# Patient Record
Sex: FEMALE | Race: ASIAN | NOT HISPANIC OR LATINO | Employment: FULL TIME | URBAN - METROPOLITAN AREA
[De-identification: names, ages, dates, MRNs, and addresses within clinical notes are randomized per-mention and may not be internally consistent; named-entity substitution may affect disease eponyms.]

---

## 2019-01-18 ENCOUNTER — OFFICE VISIT (OUTPATIENT)
Dept: INTERNAL MEDICINE | Facility: CLINIC | Age: 29
End: 2019-01-18

## 2019-01-18 VITALS
BODY MASS INDEX: 23.22 KG/M2 | WEIGHT: 139.4 LBS | TEMPERATURE: 98.4 F | SYSTOLIC BLOOD PRESSURE: 110 MMHG | OXYGEN SATURATION: 99 % | HEART RATE: 78 BPM | HEIGHT: 65 IN | DIASTOLIC BLOOD PRESSURE: 76 MMHG

## 2019-01-18 DIAGNOSIS — L73.9 FOLLICULITIS: Primary | ICD-10-CM

## 2019-01-18 DIAGNOSIS — F41.9 ANXIETY: ICD-10-CM

## 2019-01-18 DIAGNOSIS — J02.9 ACUTE PHARYNGITIS, UNSPECIFIED ETIOLOGY: ICD-10-CM

## 2019-01-18 DIAGNOSIS — L30.9 DERMATITIS: ICD-10-CM

## 2019-01-18 PROCEDURE — 99202 OFFICE O/P NEW SF 15 MIN: CPT | Performed by: NURSE PRACTITIONER

## 2019-01-18 RX ORDER — CEPHALEXIN 500 MG/1
500 CAPSULE ORAL 3 TIMES DAILY
Qty: 30 CAPSULE | Refills: 0 | OUTPATIENT
Start: 2019-01-18 | End: 2020-09-20

## 2019-01-18 NOTE — PROGRESS NOTES
"Subjective   Rosemary Hu is a 28 y.o. female.   Chief Complaint   Patient presents with   • Establish Care   • Eczema   • Anxiety      History of Present Illness  patient has eczema to her legs and arms.  She says it has gotten progressively worse.  No specific trigger.  Onset several years ago.  She is been using betamethasone cream on it.  She also has anxiety.  She says she believes it is just due to life in general.  She recently changed jobs, got , and bought a house.  Denies thoughts of self-harm.  Not in counseling.  She does report she has a bit of a sore throat.  No fever or chills.  No ear pain, headache.  Has clear sinus drainage.  No cough, shortness of air, chest pain, abdominal pain, nausea vomiting or diarrhea    Social: SHe is , works as an , does not smoke, use recreational drugs, alcohol    The following portions of the patient's history were reviewed and updated as appropriate: allergies, current medications, past family history, past medical history, past social history, past surgical history and problem list.    Current Outpatient Medications:   •  cephalexin (KEFLEX) 500 MG capsule, Take 1 capsule by mouth 3 (Three) Times a Day., Disp: 30 capsule, Rfl: 0    Review of Systems Consitutional, HEENT, Respiratory, CV, GI, , Skin, Musculoskeletal, Neuro-mental, Endocrinological, Hematological were reviewed.  Positives were discussed in the HPI, otherwise ROS was negative   /76   Pulse 78   Temp 98.4 °F (36.9 °C)   Ht 165.1 cm (65\")   Wt 63.2 kg (139 lb 6.4 oz)   LMP 01/01/2019   SpO2 99%   Breastfeeding? No   BMI 23.20 kg/m²     Objective   No Known Allergies    Physical Exam   Constitutional: She is oriented to person, place, and time. She appears well-developed and well-nourished. No distress.   HENT:   Head: Normocephalic.   Right Ear: External ear normal.   Left Ear: External ear normal.   Mouth/Throat: Oropharynx is clear and moist.   TMs are dull.  " Throat pink without exudate nontender over sinuses.  No nodes   Eyes: Right eye exhibits no discharge. Left eye exhibits no discharge. No scleral icterus.   Neck: Neck supple. No thyromegaly present.   Cardiovascular: Normal rate, regular rhythm, normal heart sounds and intact distal pulses. Exam reveals no gallop and no friction rub.   No murmur heard.  Pulmonary/Chest: Effort normal and breath sounds normal. No stridor. No respiratory distress. She has no wheezes. She has no rales.   Abdominal: Soft. Bowel sounds are normal. She exhibits no mass. There is no tenderness.   Lymphadenopathy:     She has no cervical adenopathy.   Neurological: She is alert and oriented to person, place, and time.   Skin: Skin is warm and dry. Capillary refill takes less than 2 seconds.   Is pink.  Has scaly rashes and patches to arms and legs.  She has 1 patch on the left anterior medial thigh that is red and excoriated.   Psychiatric: She has a normal mood and affect. Her behavior is normal. Judgment and thought content normal.   Nursing note and vitals reviewed.      Procedures    LABS  No results found for this or any previous visit.    Assessment/Plan   Rosemary was seen today for establish care, eczema and anxiety.    Diagnoses and all orders for this visit:    Folliculitis  -     cephalexin (KEFLEX) 500 MG capsule; Take 1 capsule by mouth 3 (Three) Times a Day.    Dermatitis  -     Ambulatory Referral to Dermatology    Anxiety    Acute pharyngitis, unspecified etiology          Patient Instructions   Cephalexin for folliculitis.  We will referred to dermatology for dermatitis.  Tylenol and antihistamine of choice for pharyngitis.  Recommend relaxation techniques and biofeedback for anxiety.  Counseling at [facility].  Return to the clinic.  Pt verbalizes understanding and agreement with plan of care.     EMR Dragon/transcription disclaimer:  Please note that portions of this note were completed with a voice recognition  program.  Electronic transcription of the voice recognition program may permit erroneous words or phrases to be inadvertently transcribed.  Although I have reviewed the note for such errors, some may still exist in this documentation   Anita Be, APRN

## 2019-01-21 NOTE — PATIENT INSTRUCTIONS
Cephalexin for folliculitis.  We will referred to dermatology for dermatitis.  Tylenol and antihistamine of choice for pharyngitis.  Recommend relaxation techniques and biofeedback for anxiety.  Counseling at [facility].  Return to the clinic.  Pt verbalizes understanding and agreement with plan of care.

## 2020-02-18 ENCOUNTER — TELEPHONE (OUTPATIENT)
Dept: INTERNAL MEDICINE | Facility: CLINIC | Age: 30
End: 2020-02-18

## 2020-02-18 NOTE — TELEPHONE ENCOUNTER
lvm for pt to est care with different provider as new patient since their last provider skinny jean is no longer in this practice.

## 2020-09-20 ENCOUNTER — APPOINTMENT (OUTPATIENT)
Dept: GENERAL RADIOLOGY | Facility: HOSPITAL | Age: 30
End: 2020-09-20

## 2020-09-20 ENCOUNTER — HOSPITAL ENCOUNTER (EMERGENCY)
Facility: HOSPITAL | Age: 30
Discharge: HOME | End: 2020-09-20
Attending: EMERGENCY MEDICINE | Admitting: EMERGENCY MEDICINE

## 2020-09-20 VITALS
HEART RATE: 56 BPM | RESPIRATION RATE: 16 BRPM | OXYGEN SATURATION: 99 % | HEIGHT: 65 IN | BODY MASS INDEX: 23.32 KG/M2 | WEIGHT: 140 LBS | SYSTOLIC BLOOD PRESSURE: 122 MMHG | TEMPERATURE: 97.7 F | DIASTOLIC BLOOD PRESSURE: 78 MMHG

## 2020-09-20 DIAGNOSIS — S86.911A KNEE STRAIN, RIGHT, INITIAL ENCOUNTER: Primary | ICD-10-CM

## 2020-09-20 PROCEDURE — 99283 EMERGENCY DEPT VISIT LOW MDM: CPT

## 2020-09-20 PROCEDURE — 73560 X-RAY EXAM OF KNEE 1 OR 2: CPT

## 2020-09-20 RX ORDER — FLUOXETINE 10 MG/1
10 CAPSULE ORAL DAILY
COMMUNITY

## 2020-09-20 NOTE — ED PROVIDER NOTES
Subjective   30-year-old female presents to the emergency department with complaints of right knee pain.  The patient states that she was playing soccer yesterday and change directions abruptly.  When she did, she felt a burning pain in the medial aspect of her right knee.  Since then, she has been unable to bear weight due to pain in the right knee.  No previous right knee problems.  She states that she has had previous left knee ACL tear but no current problems with the left knee.  No other known health issues.  She is a non-smoker.  No alcohol or drug use.  Her PCP is at the [facility].  She has no orthopedist currently.          Review of Systems   Constitutional: Negative for fever.   Respiratory: Negative for cough and shortness of breath.    Cardiovascular: Negative for chest pain.   Gastrointestinal: Negative for abdominal pain and nausea.   Genitourinary: Negative for dysuria.   Musculoskeletal: Positive for arthralgias (Right knee pain).   Skin: Negative for wound.   Neurological: Negative for numbness and headaches.   Hematological: Negative.    Psychiatric/Behavioral: Negative.        Past Medical History:   Diagnosis Date   • Anxiety    • Depression        No Known Allergies    Past Surgical History:   Procedure Laterality Date   • WISDOM TOOTH EXTRACTION         Family History   Problem Relation Age of Onset   • Hypertension Father        Social History     Socioeconomic History   • Marital status:      Spouse name: Not on file   • Number of children: Not on file   • Years of education: Not on file   • Highest education level: Not on file   Tobacco Use   • Smoking status: Never Smoker   • Smokeless tobacco: Never Used   Substance and Sexual Activity   • Alcohol use: Yes     Comment: socially   • Drug use: No   • Sexual activity: Defer           Objective   Physical Exam  Constitutional:       General: She is not in acute distress.  HENT:      Head: Normocephalic.      Nose: Nose normal.    Eyes:      Pupils: Pupils are equal, round, and reactive to light.   Neck:      Musculoskeletal: Normal range of motion.   Cardiovascular:      Rate and Rhythm: Normal rate.      Pulses: Normal pulses.   Pulmonary:      Effort: Pulmonary effort is normal.   Musculoskeletal:         General: No swelling or deformity.      Comments: Moderate tenderness on palpation over the medial aspect of the right knee.  No deformity or swelling.  Increased pain on range of motion.  Slight laxity on valgus stressing.   Skin:     General: Skin is warm and dry.      Findings: No bruising.   Neurological:      Mental Status: She is alert.      Sensory: No sensory deficit.   Psychiatric:         Mood and Affect: Mood normal.         Procedures           ED Course        Xrays of the right knee show questionable joint effusion but no fracture or dislocation.  Will place in knee immobilizer and crutches and have f/u with ortho.                                       MDM    Final diagnoses:   Knee strain, right, initial encounter            Henry Bryan, PA  09/20/20 1409

## 2020-09-20 NOTE — DISCHARGE INSTRUCTIONS
Rest.  Elevate and apply ice bag off/on as needed.  Take an over the counter antiinflammatory such as ibuprofen or Aleve as directed.  Call Dr. Calderon for follow up.  Use knee immobilizer and crutches as needed.

## 2020-10-11 ENCOUNTER — APPOINTMENT (OUTPATIENT)
Dept: PREADMISSION TESTING | Facility: HOSPITAL | Age: 30
End: 2020-10-11

## 2020-10-11 LAB — SARS-COV-2 RNA RESP QL NAA+PROBE: NOT DETECTED

## 2020-10-11 PROCEDURE — C9803 HOPD COVID-19 SPEC COLLECT: HCPCS

## 2020-10-11 PROCEDURE — U0004 COV-19 TEST NON-CDC HGH THRU: HCPCS | Performed by: INTERNAL MEDICINE

## 2022-12-19 ENCOUNTER — TRANSCRIBE ORDERS (OUTPATIENT)
Dept: ADMINISTRATIVE | Facility: HOSPITAL | Age: 32
End: 2022-12-19

## 2022-12-19 DIAGNOSIS — Z31.41 FERTILITY TESTING: Primary | ICD-10-CM

## 2022-12-23 ENCOUNTER — HOSPITAL ENCOUNTER (OUTPATIENT)
Dept: GENERAL RADIOLOGY | Facility: HOSPITAL | Age: 32
Discharge: HOME | End: 2022-12-23
Admitting: RADIOLOGY

## 2022-12-23 DIAGNOSIS — Z31.41 FERTILITY TESTING: ICD-10-CM

## 2022-12-23 PROCEDURE — 74740 X-RAY FEMALE GENITAL TRACT: CPT

## 2022-12-23 PROCEDURE — 25010000002 IOPAMIDOL 61 % SOLUTION: Performed by: SPECIALIST

## 2022-12-23 RX ADMIN — IOPAMIDOL 20 ML: 612 INJECTION, SOLUTION INTRAVENOUS at 08:28

## 2023-08-05 ENCOUNTER — HOSPITAL ENCOUNTER (EMERGENCY)
Facility: HOSPITAL | Age: 33
Discharge: HOME | End: 2023-08-05
Attending: EMERGENCY MEDICINE
Payer: COMMERCIAL

## 2023-08-05 ENCOUNTER — APPOINTMENT (OUTPATIENT)
Dept: ULTRASOUND IMAGING | Facility: HOSPITAL | Age: 33
End: 2023-08-05
Payer: COMMERCIAL

## 2023-08-05 VITALS
SYSTOLIC BLOOD PRESSURE: 118 MMHG | HEART RATE: 85 BPM | DIASTOLIC BLOOD PRESSURE: 79 MMHG | OXYGEN SATURATION: 98 % | RESPIRATION RATE: 18 BRPM | TEMPERATURE: 99.7 F | WEIGHT: 155 LBS | BODY MASS INDEX: 25.83 KG/M2 | HEIGHT: 65 IN

## 2023-08-05 DIAGNOSIS — Z34.90 EARLY STAGE OF PREGNANCY: Primary | ICD-10-CM

## 2023-08-05 LAB
ALBUMIN SERPL-MCNC: 4.3 G/DL (ref 3.5–5.2)
ALBUMIN/GLOB SERPL: 1.2 G/DL
ALP SERPL-CCNC: 59 U/L (ref 39–117)
ALT SERPL W P-5'-P-CCNC: 13 U/L (ref 1–33)
ANION GAP SERPL CALCULATED.3IONS-SCNC: 12 MMOL/L (ref 5–15)
AST SERPL-CCNC: 21 U/L (ref 1–32)
BASOPHILS # BLD AUTO: 0.07 10*3/MM3 (ref 0–0.2)
BASOPHILS NFR BLD AUTO: 0.5 % (ref 0–1.5)
BILIRUB SERPL-MCNC: 0.4 MG/DL (ref 0–1.2)
BILIRUB UR QL STRIP: NEGATIVE
BUN SERPL-MCNC: 15 MG/DL (ref 6–20)
BUN/CREAT SERPL: 14.4 (ref 7–25)
CALCIUM SPEC-SCNC: 9.2 MG/DL (ref 8.6–10.5)
CHLORIDE SERPL-SCNC: 100 MMOL/L (ref 98–107)
CLARITY UR: CLEAR
CO2 SERPL-SCNC: 22 MMOL/L (ref 22–29)
COLOR UR: YELLOW
CREAT SERPL-MCNC: 1.04 MG/DL (ref 0.57–1)
DEPRECATED RDW RBC AUTO: 36.5 FL (ref 37–54)
EGFRCR SERPLBLD CKD-EPI 2021: 73.8 ML/MIN/1.73
EOSINOPHIL # BLD AUTO: 0.08 10*3/MM3 (ref 0–0.4)
EOSINOPHIL NFR BLD AUTO: 0.5 % (ref 0.3–6.2)
ERYTHROCYTE [DISTWIDTH] IN BLOOD BY AUTOMATED COUNT: 11.2 % (ref 12.3–15.4)
GLOBULIN UR ELPH-MCNC: 3.5 GM/DL
GLUCOSE SERPL-MCNC: 97 MG/DL (ref 65–99)
GLUCOSE UR STRIP-MCNC: NEGATIVE MG/DL
HCG INTACT+B SERPL-ACNC: 9665 MIU/ML
HCT VFR BLD AUTO: 37.2 % (ref 34–46.6)
HGB BLD-MCNC: 12.8 G/DL (ref 12–15.9)
HGB UR QL STRIP.AUTO: NEGATIVE
IMM GRANULOCYTES # BLD AUTO: 0.05 10*3/MM3 (ref 0–0.05)
IMM GRANULOCYTES NFR BLD AUTO: 0.3 % (ref 0–0.5)
KETONES UR QL STRIP: NEGATIVE
LEUKOCYTE ESTERASE UR QL STRIP.AUTO: NEGATIVE
LYMPHOCYTES # BLD AUTO: 0.86 10*3/MM3 (ref 0.7–3.1)
LYMPHOCYTES NFR BLD AUTO: 5.7 % (ref 19.6–45.3)
MCH RBC QN AUTO: 30.9 PG (ref 26.6–33)
MCHC RBC AUTO-ENTMCNC: 34.4 G/DL (ref 31.5–35.7)
MCV RBC AUTO: 89.9 FL (ref 79–97)
MONOCYTES # BLD AUTO: 0.78 10*3/MM3 (ref 0.1–0.9)
MONOCYTES NFR BLD AUTO: 5.2 % (ref 5–12)
NEUTROPHILS NFR BLD AUTO: 13.23 10*3/MM3 (ref 1.7–7)
NEUTROPHILS NFR BLD AUTO: 87.8 % (ref 42.7–76)
NITRITE UR QL STRIP: NEGATIVE
NRBC BLD AUTO-RTO: 0 /100 WBC (ref 0–0.2)
PH UR STRIP.AUTO: 6 [PH] (ref 5–8)
PLATELET # BLD AUTO: 288 10*3/MM3 (ref 140–450)
PMV BLD AUTO: 8.5 FL (ref 6–12)
POTASSIUM SERPL-SCNC: 3.9 MMOL/L (ref 3.5–5.2)
PROT SERPL-MCNC: 7.8 G/DL (ref 6–8.5)
PROT UR QL STRIP: NEGATIVE
RBC # BLD AUTO: 4.14 10*6/MM3 (ref 3.77–5.28)
SODIUM SERPL-SCNC: 134 MMOL/L (ref 136–145)
SP GR UR STRIP: 1.01 (ref 1–1.03)
UROBILINOGEN UR QL STRIP: NORMAL
WBC NRBC COR # BLD: 15.07 10*3/MM3 (ref 3.4–10.8)

## 2023-08-05 PROCEDURE — 36415 COLL VENOUS BLD VENIPUNCTURE: CPT

## 2023-08-05 PROCEDURE — 76817 TRANSVAGINAL US OBSTETRIC: CPT

## 2023-08-05 PROCEDURE — 80053 COMPREHEN METABOLIC PANEL: CPT | Performed by: PHYSICIAN ASSISTANT

## 2023-08-05 PROCEDURE — 85025 COMPLETE CBC W/AUTO DIFF WBC: CPT | Performed by: PHYSICIAN ASSISTANT

## 2023-08-05 PROCEDURE — 81003 URINALYSIS AUTO W/O SCOPE: CPT | Performed by: PHYSICIAN ASSISTANT

## 2023-08-05 PROCEDURE — 99284 EMERGENCY DEPT VISIT MOD MDM: CPT

## 2023-08-05 PROCEDURE — 84702 CHORIONIC GONADOTROPIN TEST: CPT | Performed by: PHYSICIAN ASSISTANT

## 2023-08-05 NOTE — ED PROVIDER NOTES
"Subjective  History of Present Illness:    Chief Complaint: Abdominal cramping and pregnancy  History of Present Illness: 32-year-old female concerned about ovarian torsion, presents with lower abdominal mild cramping and pain no vaginal bleeding.  She was on a hike, she had to run due to avoid kidney stones at least, and now she has some lower abdominal pain and cramping, she has been using IVF, and is pregnant, she states she is approximately 4 weeks and her first appointment is able with a fertility OB/GYN.  Onset: Sudden onset  Duration: Prior to arrival  Exacerbating / Alleviating factors: Running caused her to have some lower abdominal discomfort  Associated symptoms: No vaginal bleeding no other symptoms      Nurses Notes reviewed and agree, including vitals, allergies, social history and prior medical history.     REVIEW OF SYSTEMS: All systems reviewed and not pertinent unless noted.    Review of Systems   Genitourinary:         Vaginal cramping   All other systems reviewed and are negative.    Past Medical History:   Diagnosis Date    Anxiety     Depression        Allergies:    Patient has no known allergies.      Past Surgical History:   Procedure Laterality Date    WISDOM TOOTH EXTRACTION           Social History     Socioeconomic History    Marital status:    Tobacco Use    Smoking status: Never    Smokeless tobacco: Never   Vaping Use    Vaping Use: Never used   Substance and Sexual Activity    Alcohol use: Yes     Comment: socially    Drug use: No    Sexual activity: Defer         Family History   Problem Relation Age of Onset    Hypertension Father        Objective  Physical Exam:  /79 (BP Location: Right arm, Patient Position: Lying)   Pulse 85   Temp 99.7 øF (37.6 øC) (Oral)   Resp 18   Ht 165.1 cm (65\")   Wt 70.3 kg (155 lb)   LMP 07/08/2023   SpO2 98%   BMI 25.79 kg/mý      Physical Exam  Vitals and nursing note reviewed.   Constitutional:       Appearance: She is " well-developed.   HENT:      Head: Normocephalic and atraumatic.   Cardiovascular:      Rate and Rhythm: Normal rate and regular rhythm.   Pulmonary:      Effort: Pulmonary effort is normal.      Breath sounds: Normal breath sounds.   Abdominal:      Palpations: Abdomen is soft.   Musculoskeletal:         General: Normal range of motion.      Cervical back: Normal range of motion and neck supple.   Skin:     General: Skin is warm and dry.   Neurological:      Mental Status: She is alert and oriented to person, place, and time.      Deep Tendon Reflexes: Reflexes are normal and symmetric.         Procedures    ED Course:    ED Course as of 08/05/23 1805   Sat Aug 05, 2023   1727 discussed results with the patient and family at the bedside, early pregnancy versus nonviable, quantitative 9665 patient reports that it was below 1000 at her first visit so this has progressed compared to those numbers.  She has an appointment in 10 days, I recommended that she call in 2 days for follow-up that her OB/GYn may require repeat hormone and ultrasound. [CS]      ED Course User Index  [CS] Mauro Montez Jr., BRAXTON       Lab Results (last 24 hours)       Procedure Component Value Units Date/Time    Urinalysis With Culture If Indicated - Urine, Clean Catch [[id number]]  (Normal) Collected: 08/05/23 1518    Specimen: Urine, Clean Catch Updated: 08/05/23 1530     Color, UA Yellow     Appearance, UA Clear     pH, UA 6.0     Specific Gravity, UA 1.014     Glucose, UA Negative     Ketones, UA Negative     Bilirubin, UA Negative     Blood, UA Negative     Protein, UA Negative     Leuk Esterase, UA Negative     Nitrite, UA Negative     Urobilinogen, UA 0.2 E.U./dL    Narrative:      In absence of clinical symptoms, the presence of pyuria, bacteria, and/or nitrites on the urinalysis result does not correlate with infection.  Urine microscopic not indicated.    hCG, Quantitative, Pregnancy [[id number]] Collected: 08/05/23 1519    Specimen:  Blood Updated: 08/05/23 1601     HCG Quantitative 9,665.00 mIU/mL     Narrative:      HCG Ranges by Gestational Age    Females - non-pregnant premenopausal   </= 1mIU/mL HCG  Females - postmenopausal               </= 7mIU/mL HCG    3 Weeks         5.8 -    71.2 mIU/mL  4 Weeks         9.5 -     750 mIU/mL  5 Weeks         217 -   7,138 mIU/mL  6 Weeks         158 -  31,795 mIU/mL  7 Weeks       3,697 - 163,563 mIU/mL  8 Weeks      32,065 - 149,571 mIU/mL  9 Weeks      63,803 - 151,410 mIU/mL  10 Weeks     46,509 - 186,977 mIU/mL  12 Weeks     27,832 - 210,612 mIU/mL  14 Weeks     13,950 -  62,530 mIU/mL  15 Weeks     12,039 -  70,971 mIU/mL  16 Weeks      9,040 -  56,451 mIU/mL  17 Weeks      8,175 -  55,868 mIU/mL  18 Weeks      8,099 -  58,176 mIU/mL  Results may be falsely decreased if patient taking Biotin.      CBC Auto Differential [[id number]]  (Abnormal) Collected: 08/05/23 1519    Specimen: Blood Updated: 08/05/23 1530     WBC 15.07 10*3/mm3      RBC 4.14 10*6/mm3      Hemoglobin 12.8 g/dL      Hematocrit 37.2 %      MCV 89.9 fL      MCH 30.9 pg      MCHC 34.4 g/dL      RDW 11.2 %      RDW-SD 36.5 fl      MPV 8.5 fL      Platelets 288 10*3/mm3      Neutrophil % 87.8 %      Lymphocyte % 5.7 %      Monocyte % 5.2 %      Eosinophil % 0.5 %      Basophil % 0.5 %      Immature Grans % 0.3 %      Neutrophils, Absolute 13.23 10*3/mm3      Lymphocytes, Absolute 0.86 10*3/mm3      Monocytes, Absolute 0.78 10*3/mm3      Eosinophils, Absolute 0.08 10*3/mm3      Basophils, Absolute 0.07 10*3/mm3      Immature Grans, Absolute 0.05 10*3/mm3      nRBC 0.0 /100 WBC     Comprehensive Metabolic Panel [[id number]]  (Abnormal) Collected: 08/05/23 1519    Specimen: Blood Updated: 08/05/23 1549     Glucose 97 mg/dL      BUN 15 mg/dL      Creatinine 1.04 mg/dL      Sodium 134 mmol/L      Potassium 3.9 mmol/L      Chloride 100 mmol/L      CO2 22.0 mmol/L      Calcium 9.2 mg/dL      Total Protein 7.8 g/dL      Albumin 4.3 g/dL       ALT (SGPT) 13 U/L      AST (SGOT) 21 U/L      Alkaline Phosphatase 59 U/L      Total Bilirubin 0.4 mg/dL      Globulin 3.5 gm/dL      Comment: Calculated Result        A/G Ratio 1.2 g/dL      BUN/Creatinine Ratio 14.4     Anion Gap 12.0 mmol/L      eGFR 73.8 mL/min/1.73     Narrative:      GFR Normal >60  Chronic Kidney Disease <60  Kidney Failure <15               US Ob Transvaginal    Result Date: 8/5/2023  US OB TRANSVAGINAL Date of Exam: 8/5/2023 4:15 PM EDT Indication: lower abd cramping pregnant. Comparison: No comparisons available. Technique: Transvaginal ultrasound was performed to evaluate pregnancy.  Real time scanning was performed with multiple image documentation per protocol.  Findings: The uterus is grossly unremarkable in appearance measuring 9.3 x 5.3 x 5.8 cm. Gestational sac with a mean sac diameter of 1.2 cm corresponding to an estimated gestational age of 6 weeks and 0 days by current ultrasound. There is a gestational sac without definite fetal pole or yolk sac identified. Right ovary is mildly enlarged measuring 6.1 x 3.6 x 3.3 cm. Hypoechoic 2.1 x 1.6 x 2.1 cm structure within the right ovary without internal flow with increased debris noted. A second 2.0 x 2.1 x 2.5 cm probable corpus luteal cyst noted. Left ovary is unremarkable in appearance measuring 3.5 x 3.0 x 2.4 cm. No definite free fluid.     Impression: Impression: Gestational sac with mean sac diameter corresponding to an estimated gestational age of 6 weeks 0 days. No fetal pole noted at this time. Differential includes early intrauterine pregnancy too small to characterize or nonviable pregnancy Small hypoechoic structures within the right ovary which may represent atypical hemorrhagic cyst and probable involuting corpus luteal cyst. Recommend follow-up as clinically indicated Electronically Signed: Epi Mckeon  8/5/2023 5:14 PM EDT  Workstation ID: [id number]        Medical Decision Making  32-year-old female presents with  lower abdominal discomfort and concern for ovarian torsion after she was running while hiking to avoid bee stings.  She sees a fertility clinic because she has received IVF, she is very early in her pregnancy.  I reviewed and summarized previous medical records, labs were drawn and ultrasound was performed, she did have elevation in her white count but she just recently been hiking and got stung by several bees this could be an inflammatory response.  On my exam she was in no acute distress abdomen soft nontender nondistended.  Ultrasound showed gestational sac and no fetal pole early pregnancy versus nonviable the patient is not in any excruciating pain and she has no vaginal bleeding, findings likely consistent with early pregnancy, she has an appointment with her fertility doctor and will call in 2 days.    Problems Addressed:  Early stage of pregnancy: complicated acute illness or injury    Amount and/or Complexity of Data Reviewed  Labs: ordered. Decision-making details documented in ED Course.  Radiology: ordered. Decision-making details documented in ED Course.          Final diagnoses:   Early stage of pregnancy          Mauro Montez Jr., PA-OG  08/05/23 7824

## 2023-09-01 ENCOUNTER — INITIAL PRENATAL (OUTPATIENT)
Dept: OBSTETRICS AND GYNECOLOGY | Facility: CLINIC | Age: 33
End: 2023-09-01
Payer: COMMERCIAL

## 2023-09-01 VITALS — DIASTOLIC BLOOD PRESSURE: 68 MMHG | WEIGHT: 157 LBS | BODY MASS INDEX: 26.13 KG/M2 | SYSTOLIC BLOOD PRESSURE: 100 MMHG

## 2023-09-01 DIAGNOSIS — O09.819 PREGNANCY RESULTING FROM IN VITRO FERTILIZATION, ANTEPARTUM: ICD-10-CM

## 2023-09-01 DIAGNOSIS — Z34.91 FIRST TRIMESTER PREGNANCY: Primary | ICD-10-CM

## 2023-09-01 RX ORDER — FOLIC ACID, .BETA.-CAROTENE, ASCORBIC ACID, CHOLECALCIFEROL, .ALPHA.-TOCOPHEROL ACETATE, DL-, THIAMINE MONONITRATE, RIBOFLAVIN, NIACINAMIDE, PYRIDOXINE HYDROCHLORIDE, CYANOCOBALAMIN, CALCIUM PANTOTHENATE, CALCIUM CARBONATE, FERROUS FUMARATE, AND ZINC OXIDE 1; 1000; 100; 400; 30; 3; 3; 15; 20; 12; 7; 200; 29; 20 MG/1; [IU]/1; MG/1; [IU]/1; [IU]/1; MG/1; MG/1; MG/1; MG/1; UG/1; MG/1; MG/1; MG/1; MG/1
TABLET, CHEWABLE ORAL
COMMUNITY
Start: 2023-06-29

## 2023-09-01 RX ORDER — PROGESTERONE 200 MG/1
CAPSULE ORAL
COMMUNITY
Start: 2023-07-31

## 2023-09-01 NOTE — PROGRESS NOTES
Initial ob visit     CC- Here for care of pregnancy        Rosemary Hu is a 33 y.o. female, , who presents for her first obstetrical visit.  Her last LMP was Patient's last menstrual period was 2023.. Her LAINA is 2024, by Last Menstrual Period. Current GA is 8w6d.     This is an invitro pregnancy.    Initial positive test date : 2023, UPT        Her periods are: every 4 weeks  Prior obstetric issues: none  Patient's past medical history is significant for:  none .  Family history of genetic issues (includes FOB): no  Prior infections concerning in pregnancy (Rash, fever in last 2 weeks): No  Varicella Hx - vaccinated  Prior testing for Cystic Fibrosis Carrier or Sickle Cell Trait- none  Prepregnancy BMI - Body mass index is 26.13 kg/mý.  History of STD: no  Hx of HSV for patient or partner: no  Ultrasound Today: yes    OB History    Para Term  AB Living   2       1     SAB IAB Ectopic Molar Multiple Live Births   1                # Outcome Date GA Lbr Bebo/2nd Weight Sex Delivery Anes PTL Lv   2 Current            1 SAB                Additional Pertinent History   Last Pap : 2022 Result: negative      Last Completed Pap Smear       This patient has no relevant Health Maintenance data.          History of abnormal Pap smear: no  Family history of uterine, colon, breast, or ovarian cancer: no  Feelings of Anxiety or Depression: no  Tobacco Usage?: No   Alcohol/Drug Use?: NO  Over the age of 35 at delivery: no  Desires Genetic Screening: None    PMH    Current Outpatient Medications:     Prenatal Vit-Fe Fumarate-FA (Prenatal 19) 29-1 MG chewable tablet, , Disp: , Rfl:     Progesterone (PROMETRIUM) 200 MG capsule, INSERT 1 CAPSULE VAGINALLY THREE TIMES DAILY, Disp: , Rfl:      Past Medical History:   Diagnosis Date    Anxiety     Depression         Past Surgical History:   Procedure Laterality Date    KNEE ACL RECONSTRUCTION      OTHER SURGICAL HISTORY      egg  retrieval X2    WISDOM TOOTH EXTRACTION         Review of Systems   Review of Systems    Patient Reports: Nausea and vomiting and fatigue  Patient Denies: Spotting, Heavy bleeding, and Cramping  All systems reviewed and otherwise normal.    I have reviewed and agree with the HPI, ROS, and historical information as entered above. Betzy Ramirez MD      /68   Wt 71.2 kg (157 lb)   LMP 2023   BMI 26.13 kg/mý     The additional following portions of the patient's history were reviewed and updated as appropriate: allergies, current medications, past family history, past medical history, past social history, and past surgical history.    Physical Exam  General:  well developed; well nourished  no acute distress   Chest/Respiratory: No labored breathing, normal respiratory effort, normal appearance, no respiratory noises noted   Heart:  normal rate, regular rhythm,  no murmurs, rubs, or gallops   Thyroid: normal to inspection and palpation   Breasts:  Not performed.   Abdomen: soft, non-tender; no masses  no umbilical or inguinal hernias are present  no hepato-splenomegaly   Pelvis: Not performed.        Assessment and Plan    Problem List Items Addressed This Visit          Gravid and     Pregnancy resulting from in vitro fertilization, antepartum     Other Visit Diagnoses       First trimester pregnancy    -  Primary    Relevant Orders    Obstetric Panel    HIV-1 / O / 2 Ag / Antibody    Urine Culture - Urine, Urine, Clean Catch    Urinalysis With Microscopic - Urine, Clean Catch    Urine Drug Screen - Urine, Clean Catch            Pregnancy at 8w6d  Reviewed routine prenatal care with the office and educational materials given  Discussed options for genetic testing including first trimester nuchal translucency screen, genetic disease carrier testing, quadruple screen, and NIPT  Mat 21 today.  IVF - will need fetal echo./ PDC scan  Rec covid/flu vaccine      Betzy Ramirez MD  2023

## 2023-09-02 LAB
5P15 DELETION (CRI-DU-CHAT): NORMAL
ABO GROUP BLD: NORMAL
BASOPHILS # BLD AUTO: 0.1 X10E3/UL (ref 0–0.2)
BASOPHILS NFR BLD AUTO: 1 %
BLD GP AB SCN SERPL QL: NEGATIVE
CFDNA.FET/CFDNA.TOTAL SFR FETUS: NORMAL %
CITATION REF LAB TEST: NORMAL
EOSINOPHIL # BLD AUTO: 0.4 X10E3/UL (ref 0–0.4)
EOSINOPHIL NFR BLD AUTO: 4 %
ERYTHROCYTE [DISTWIDTH] IN BLOOD BY AUTOMATED COUNT: 11.9 % (ref 11.7–15.4)
FET 13+18+21+X+Y ANEUP PLAS.CFDNA: NORMAL
FET 1P36 DEL RISK WBC.DNA+CFDNA QL: NORMAL
FET 22Q11.2 DEL RISK WBC.DNA+CFDNA QL: NORMAL
FET CHR 11Q23 DEL PLAS.CFDNA QL: NORMAL
FET CHR 15Q11 DEL PLAS.CFDNA QL: NORMAL
FET CHR 21 TS PLAS.CFDNA QL: NORMAL
FET CHR 4P16 DEL PLAS.CFDNA QL: NORMAL
FET CHR 8Q24 DEL PLAS.CFDNA QL: NORMAL
FET MS X RISK WBC.DNA+CFDNA QL: NORMAL
FET SEX PLAS.CFDNA DOSAGE CFDNA: NORMAL
FET TS 13 RISK PLAS.CFDNA QL: NORMAL
FET TS 18 RISK WBC.DNA+CFDNA QL: NORMAL
FET X + Y ANEUP RISK PLAS.CFDNA SEQ-IMP: NORMAL
GA EST FROM CONCEPTION DATE: NORMAL D
GESTATIONAL AGE > 9:: NORMAL
HBV SURFACE AG SERPL QL IA: NEGATIVE
HCT VFR BLD AUTO: 38.4 % (ref 34–46.6)
HCV IGG SERPL QL IA: NON REACTIVE
HGB BLD-MCNC: 13 G/DL (ref 11.1–15.9)
HIV 1+2 AB+HIV1 P24 AG SERPL QL IA: NON REACTIVE
IMM GRANULOCYTES # BLD AUTO: 0 X10E3/UL (ref 0–0.1)
IMM GRANULOCYTES NFR BLD AUTO: 0 %
LAB DIRECTOR NAME PROVIDER: NORMAL
LAB DIRECTOR NAME PROVIDER: NORMAL
LABORATORY COMMENT REPORT: NORMAL
LIMITATIONS OF THE TEST: NORMAL
LYMPHOCYTES # BLD AUTO: 1.8 X10E3/UL (ref 0.7–3.1)
LYMPHOCYTES NFR BLD AUTO: 18 %
MCH RBC QN AUTO: 30.7 PG (ref 26.6–33)
MCHC RBC AUTO-ENTMCNC: 33.9 G/DL (ref 31.5–35.7)
MCV RBC AUTO: 91 FL (ref 79–97)
MONOCYTES # BLD AUTO: 0.8 X10E3/UL (ref 0.1–0.9)
MONOCYTES NFR BLD AUTO: 8 %
NEGATIVE PREDICTIVE VALUE: NORMAL
NEUTROPHILS # BLD AUTO: 6.7 X10E3/UL (ref 1.4–7)
NEUTROPHILS NFR BLD AUTO: 69 %
NOTE: NORMAL
PERFORMANCE CHARACTERISTICS: NORMAL
PLATELET # BLD AUTO: 293 X10E3/UL (ref 150–450)
POSITIVE PREDICTIVE VALUE: NORMAL
RBC # BLD AUTO: 4.24 X10E6/UL (ref 3.77–5.28)
REF LAB TEST METHOD: NORMAL
RH BLD: POSITIVE
RPR SER QL: NON REACTIVE
RUBV IGG SERPL IA-ACNC: 1.39 INDEX
TEST PERFORMANCE INFO SPEC: NORMAL
TRIOSOMY 16: NORMAL
TRISOMY 22: NORMAL
WBC # BLD AUTO: 9.7 X10E3/UL (ref 3.4–10.8)

## 2023-09-03 LAB
AMPHETAMINES UR QL SCN: NEGATIVE NG/ML
APPEARANCE UR: CLEAR
BACTERIA #/AREA URNS HPF: NORMAL /[HPF]
BACTERIA UR CULT: NO GROWTH
BACTERIA UR CULT: NORMAL
BARBITURATES UR QL SCN: NEGATIVE NG/ML
BENZODIAZ UR QL SCN: NEGATIVE NG/ML
BILIRUB UR QL STRIP: NEGATIVE
BZE UR QL SCN: NEGATIVE NG/ML
CANNABINOIDS UR QL SCN: NEGATIVE NG/ML
CASTS URNS QL MICRO: NORMAL /LPF
COLOR UR: YELLOW
CREAT UR-MCNC: 31.1 MG/DL (ref 20–300)
EPI CELLS #/AREA URNS HPF: NORMAL /HPF (ref 0–10)
GLUCOSE UR QL STRIP: NEGATIVE
HGB UR QL STRIP: NEGATIVE
KETONES UR QL STRIP: NEGATIVE
LABORATORY COMMENT REPORT: NORMAL
LEUKOCYTE ESTERASE UR QL STRIP: NEGATIVE
METHADONE UR QL SCN: NEGATIVE NG/ML
MICRO URNS: NORMAL
MICRO URNS: NORMAL
NITRITE UR QL STRIP: NEGATIVE
OPIATES UR QL SCN: NEGATIVE NG/ML
OXYCODONE+OXYMORPHONE UR QL SCN: NEGATIVE NG/ML
PCP UR QL: NEGATIVE NG/ML
PH UR STRIP: 7.5 [PH] (ref 5–7.5)
PH UR: 6.7 [PH] (ref 4.5–8.9)
PROPOXYPH UR QL SCN: NEGATIVE NG/ML
PROT UR QL STRIP: NEGATIVE
RBC #/AREA URNS HPF: NORMAL /HPF (ref 0–2)
SP GR UR STRIP: 1.01 (ref 1–1.03)
UROBILINOGEN UR STRIP-MCNC: 0.2 MG/DL (ref 0.2–1)
WBC #/AREA URNS HPF: NORMAL /HPF (ref 0–5)

## 2023-09-06 LAB
ABO GROUP BLD: NORMAL
BASOPHILS # BLD AUTO: 0.1 X10E3/UL (ref 0–0.2)
BASOPHILS NFR BLD AUTO: 1 %
BLD GP AB SCN SERPL QL: NEGATIVE
CFDNA.FET/CFDNA.TOTAL SFR FETUS: ABNORMAL %
CITATION REF LAB TEST: ABNORMAL
EOSINOPHIL # BLD AUTO: 0.4 X10E3/UL (ref 0–0.4)
EOSINOPHIL NFR BLD AUTO: 4 %
ERYTHROCYTE [DISTWIDTH] IN BLOOD BY AUTOMATED COUNT: 11.9 % (ref 11.7–15.4)
FET 13+18+21+X+Y ANEUP PLAS.CFDNA: ABNORMAL
GA EST FROM CONCEPTION DATE: ABNORMAL D
GESTATIONAL AGE > 9:: NO
HBV SURFACE AG SERPL QL IA: NEGATIVE
HCT VFR BLD AUTO: 38.4 % (ref 34–46.6)
HCV IGG SERPL QL IA: NON REACTIVE
HGB BLD-MCNC: 13 G/DL (ref 11.1–15.9)
HIV 1+2 AB+HIV1 P24 AG SERPL QL IA: NON REACTIVE
IMM GRANULOCYTES # BLD AUTO: 0 X10E3/UL (ref 0–0.1)
IMM GRANULOCYTES NFR BLD AUTO: 0 %
LAB DIRECTOR NAME PROVIDER: ABNORMAL
LAB DIRECTOR NAME PROVIDER: ABNORMAL
LABORATORY COMMENT REPORT: ABNORMAL
LIMITATIONS OF THE TEST: ABNORMAL
LYMPHOCYTES # BLD AUTO: 1.8 X10E3/UL (ref 0.7–3.1)
LYMPHOCYTES NFR BLD AUTO: 18 %
MCH RBC QN AUTO: 30.7 PG (ref 26.6–33)
MCHC RBC AUTO-ENTMCNC: 33.9 G/DL (ref 31.5–35.7)
MCV RBC AUTO: 91 FL (ref 79–97)
MONOCYTES # BLD AUTO: 0.8 X10E3/UL (ref 0.1–0.9)
MONOCYTES NFR BLD AUTO: 8 %
NEGATIVE PREDICTIVE VALUE: ABNORMAL
NEUTROPHILS # BLD AUTO: 6.7 X10E3/UL (ref 1.4–7)
NEUTROPHILS NFR BLD AUTO: 69 %
NOTE: ABNORMAL
PERFORMANCE CHARACTERISTICS: ABNORMAL
PLATELET # BLD AUTO: 293 X10E3/UL (ref 150–450)
RBC # BLD AUTO: 4.24 X10E6/UL (ref 3.77–5.28)
REF LAB TEST METHOD: ABNORMAL
RH BLD: POSITIVE
RPR SER QL: NON REACTIVE
RUBV IGG SERPL IA-ACNC: 1.39 INDEX
TEST PERFORMANCE INFO SPEC: ABNORMAL
WBC # BLD AUTO: 9.7 X10E3/UL (ref 3.4–10.8)

## 2023-09-19 ENCOUNTER — LAB (OUTPATIENT)
Dept: OBSTETRICS AND GYNECOLOGY | Facility: CLINIC | Age: 33
End: 2023-09-19
Payer: COMMERCIAL

## 2023-09-19 DIAGNOSIS — Z34.91 FIRST TRIMESTER PREGNANCY: Primary | ICD-10-CM

## 2023-09-28 ENCOUNTER — ROUTINE PRENATAL (OUTPATIENT)
Dept: OBSTETRICS AND GYNECOLOGY | Facility: CLINIC | Age: 33
End: 2023-09-28
Payer: COMMERCIAL

## 2023-09-28 VITALS — DIASTOLIC BLOOD PRESSURE: 68 MMHG | BODY MASS INDEX: 25.93 KG/M2 | SYSTOLIC BLOOD PRESSURE: 106 MMHG | WEIGHT: 155.8 LBS

## 2023-09-28 DIAGNOSIS — Z36.9 ENCOUNTER FOR ANTENATAL SCREENING: ICD-10-CM

## 2023-09-28 DIAGNOSIS — Z34.91 PRENATAL CARE IN FIRST TRIMESTER: Primary | ICD-10-CM

## 2023-09-28 LAB
GLUCOSE UR STRIP-MCNC: NEGATIVE MG/DL
PROT UR STRIP-MCNC: NEGATIVE MG/DL

## 2023-09-28 RX ORDER — ZINC GLUCONATE 50 MG
TABLET ORAL
COMMUNITY

## 2023-09-28 RX ORDER — ASPIRIN 81 MG/1
81 TABLET, CHEWABLE ORAL DAILY
COMMUNITY

## 2023-09-28 RX ORDER — MELATONIN
1000 DAILY
COMMUNITY

## 2023-09-28 RX ORDER — LANOLIN ALCOHOL/MO/W.PET/CERES
50 CREAM (GRAM) TOPICAL DAILY PRN
COMMUNITY

## 2023-09-28 RX ORDER — ASCORBIC ACID 500 MG
500 TABLET ORAL DAILY
COMMUNITY

## 2023-09-28 NOTE — PROGRESS NOTES
OB FOLLOW UP  CC- Here for care of pregnancy        Rosemary Hu is a 33 y.o.  12w5d patient being seen today for her obstetrical follow up visit. Patient reports a headache last Thursday that lasted several days. Patient reports that she had worked for [employer] for [employer] that day and thinks it was related to that. It improved after Tylenol & rest. Patient reports some occasional lower abdominal cramping. Patient also reports some occasional nausea & vomiting. Patient states it does not occur as often as it was. Patient is taking Vitamin B 6 & Unisom, but it is not giving her much relief.    Patient had Covid on 23. Patient is taking ASA, Vitamin C, Vitamin D, & Zinc.    Her prenatal care is complicated by (and status) :   Patient Active Problem List   Diagnosis    Pregnancy resulting from in vitro fertilization, antepartum       Desires genetic testing?:  23- Negative, Boy  Flu Status: Will get at work/pharmacy/other facility   Ultrasound Today: No    ROS -   Patient Reports :  see above  Patient Denies: Loss of Fluid, Vaginal Spotting, and Vision Changes  Fetal Movement :  N/A  All other systems reviewed and are negative.     The additional following portions of the patient's history were reviewed and updated as appropriate: allergies and current medications.    I have reviewed and agree with the HPI, ROS, and historical information as entered above. .   Betzy Ramirez MD        /68   Wt 70.7 kg (155 lb 12.8 oz)   LMP 2023   BMI 25.93 kg/m²         EXAM:     Prenatal Vitals  BP: 106/68  Weight: 70.7 kg (155 lb 12.8 oz)   Fetal Heart Rate: pos          Urine Glucose Read-only: Negative  Urine Protein Read-only: Negative       Assessment and Plan    Problem List Items Addressed This Visit    None  Visit Diagnoses       Prenatal care in first trimester    -  Primary    Relevant Orders    POC Urinalysis Dipstick (Completed)    Chlamydia trachomatis, Neisseria  gonorrhoeae, PCR - Urine, Urine, Random Void    Encounter for  screening        Relevant Orders    Chlamydia trachomatis, Neisseria gonorrhoeae, PCR - Urine, Urine, Random Void            Pregnancy at 12w5d  Labs reviewed from New OB Visit.  Counseled on genetic testing, carrier status and option for NT screen  Activity and Exercise discussed.  Patient is on Prenatal vitamins  Discussed exercise in pregnancy  Return in about 4 weeks (around 10/26/2023).    Betzy Ramirez MD  2023

## 2023-09-29 LAB
C TRACH RRNA SPEC QL NAA+PROBE: NEGATIVE
N GONORRHOEA RRNA SPEC QL NAA+PROBE: NEGATIVE

## 2023-10-26 ENCOUNTER — ROUTINE PRENATAL (OUTPATIENT)
Dept: OBSTETRICS AND GYNECOLOGY | Facility: CLINIC | Age: 33
End: 2023-10-26
Payer: COMMERCIAL

## 2023-10-26 VITALS — DIASTOLIC BLOOD PRESSURE: 60 MMHG | WEIGHT: 158.6 LBS | SYSTOLIC BLOOD PRESSURE: 106 MMHG | BODY MASS INDEX: 26.39 KG/M2

## 2023-10-26 DIAGNOSIS — Z34.90 PRENATAL CARE, ANTEPARTUM: Primary | ICD-10-CM

## 2023-10-26 LAB
GLUCOSE UR STRIP-MCNC: NEGATIVE MG/DL
PROT UR STRIP-MCNC: NEGATIVE MG/DL
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
## 2023-11-22 ENCOUNTER — ROUTINE PRENATAL (OUTPATIENT)
Dept: OBSTETRICS AND GYNECOLOGY | Facility: CLINIC | Age: 33
End: 2023-11-22
Payer: COMMERCIAL

## 2023-11-22 VITALS — BODY MASS INDEX: 26.86 KG/M2 | SYSTOLIC BLOOD PRESSURE: 108 MMHG | DIASTOLIC BLOOD PRESSURE: 64 MMHG | WEIGHT: 161.4 LBS

## 2023-11-22 DIAGNOSIS — O09.819 PREGNANCY RESULTING FROM IN VITRO FERTILIZATION, ANTEPARTUM: ICD-10-CM

## 2023-11-22 DIAGNOSIS — Z34.92 SECOND TRIMESTER PREGNANCY: Primary | ICD-10-CM

## 2023-11-22 DIAGNOSIS — O35.BXX0 FETAL CARDIAC ECHOGENIC FOCUS, ANTEPARTUM, SINGLE OR UNSPECIFIED FETUS: ICD-10-CM

## 2023-11-22 LAB
GLUCOSE UR STRIP-MCNC: NEGATIVE MG/DL
PROT UR STRIP-MCNC: NEGATIVE MG/DL

## 2023-11-22 NOTE — PROGRESS NOTES
OB FOLLOW UP  CC- Here for care of pregnancy        Rosemary Hu is a 33 y.o.  20w4d patient being seen today for her obstetrical follow up visit. Patient reports no complaints..     Her prenatal care is complicated by (and status) :   Patient Active Problem List   Diagnosis    Pregnancy resulting from in vitro fertilization, antepartum    Fetal cardiac echogenic focus, antepartum       Ultrasound Today: Yes    ROS -   Patient Reports : No Problems  Patient Denies: Loss of Fluid, Vaginal Spotting, Vision Changes, Headaches, Nausea , Vomiting , Contractions, and Epigastric pain  Fetal Movement : normal  All other systems reviewed and are negative.       The additional following portions of the patient's history were reviewed and updated as appropriate: allergies and current medications.      I have reviewed and agree with the HPI, ROS, and historical information as entered above. Betzy Ramirez MD      /64   Wt 73.2 kg (161 lb 6.4 oz)   LMP 2023   BMI 26.86 kg/m²       EXAM:     Prenatal Vitals  BP: 108/64  Weight: 73.2 kg (161 lb 6.4 oz)   Fetal Heart Rate: 148          Urine Glucose Read-only: Negative  Urine Protein Read-only: Negative       Assessment and Plan    Problem List Items Addressed This Visit          Gravid and     Pregnancy resulting from in vitro fertilization, antepartum    Fetal cardiac echogenic focus, antepartum     Other Visit Diagnoses       Second trimester pregnancy    -  Primary    Relevant Orders    POC Urinalysis Dipstick (Completed)            Pregnancy at 20w4d  Anatomy scan today is complete and appear within normal limits.  EIF - normal NIPTS.  Counseled likely normal variant. PDC to scan due to IVF  Fetal status reassuring.   Activity and Exercise discussed.  Patient is on Prenatal vitamins  Return in about 4 weeks (around 2023), or PDC scan for IVF / Echo 2-3 weeks,  LOS in 4.    Betzy Ramirez MD  2023

## 2023-12-20 ENCOUNTER — HOSPITAL ENCOUNTER (OUTPATIENT)
Dept: WOMENS IMAGING | Facility: HOSPITAL | Age: 33
Discharge: HOME | End: 2023-12-20
Admitting: OBSTETRICS & GYNECOLOGY
Payer: COMMERCIAL

## 2023-12-20 ENCOUNTER — OFFICE VISIT (OUTPATIENT)
Dept: OBSTETRICS AND GYNECOLOGY | Facility: HOSPITAL | Age: 33
End: 2023-12-20
Payer: COMMERCIAL

## 2023-12-20 ENCOUNTER — ROUTINE PRENATAL (OUTPATIENT)
Dept: OBSTETRICS AND GYNECOLOGY | Facility: CLINIC | Age: 33
End: 2023-12-20
Payer: COMMERCIAL

## 2023-12-20 VITALS
WEIGHT: 166.4 LBS | BODY MASS INDEX: 28.41 KG/M2 | HEIGHT: 64 IN | SYSTOLIC BLOOD PRESSURE: 108 MMHG | DIASTOLIC BLOOD PRESSURE: 55 MMHG

## 2023-12-20 VITALS — SYSTOLIC BLOOD PRESSURE: 102 MMHG | DIASTOLIC BLOOD PRESSURE: 60 MMHG | BODY MASS INDEX: 28.56 KG/M2 | WEIGHT: 166.4 LBS

## 2023-12-20 DIAGNOSIS — O35.BXX0 FETAL CARDIAC ECHOGENIC FOCUS, ANTEPARTUM, SINGLE OR UNSPECIFIED FETUS: ICD-10-CM

## 2023-12-20 DIAGNOSIS — Z34.90 PREGNANCY, UNSPECIFIED GESTATIONAL AGE: ICD-10-CM

## 2023-12-20 DIAGNOSIS — O09.819 PREGNANCY RESULTING FROM IN VITRO FERTILIZATION, ANTEPARTUM: ICD-10-CM

## 2023-12-20 DIAGNOSIS — O09.819 PREGNANCY RESULTING FROM IN VITRO FERTILIZATION, ANTEPARTUM: Primary | ICD-10-CM

## 2023-12-20 DIAGNOSIS — Z34.90 PRENATAL CARE, ANTEPARTUM: Primary | ICD-10-CM

## 2023-12-20 LAB
GLUCOSE UR STRIP-MCNC: NEGATIVE MG/DL
PROT UR STRIP-MCNC: NEGATIVE MG/DL

## 2023-12-20 PROCEDURE — 93325 DOPPLER ECHO COLOR FLOW MAPG: CPT

## 2023-12-20 PROCEDURE — 76811 OB US DETAILED SNGL FETUS: CPT

## 2023-12-20 PROCEDURE — 76825 ECHO EXAM OF FETAL HEART: CPT

## 2023-12-20 NOTE — PROGRESS NOTES
OB FOLLOW UP  CC- Here for care of pregnancy        Rosemary Hu is a 33 y.o.  24w4d patient being seen today for her obstetrical follow up visit. Patient reports no complaints..     Her prenatal care is complicated by (and status) : None  Patient Active Problem List   Diagnosis    Pregnancy resulting from in vitro fertilization, antepartum    Fetal cardiac echogenic focus, antepartum    Pregnancy       Flu Status: Already given in current flu season  Ultrasound Today: Yes @ [facility]C    ROS -   Patient Reports : No Problems  Patient Denies: Loss of Fluid, Vaginal Spotting, Vision Changes, Headaches, Nausea , Vomiting , and Contractions  Fetal Movement : normal  All other systems reviewed and are negative.       The additional following portions of the patient's history were reviewed and updated as appropriate: allergies, current medications, past family history, past medical history, past social history, past surgical history, and problem list.      I have reviewed and agree with the HPI, ROS, and historical information as entered above. Betzy Ramirez MD      /60   Wt 75.5 kg (166 lb 6.4 oz)   LMP 2023   BMI 28.56 kg/m²       EXAM:     Prenatal Vitals  BP: 102/60  Weight: 75.5 kg (166 lb 6.4 oz)   Fetal Heart Rate: pos               Urine Glucose Read-only: Negative  Urine Protein Read-only: Negative       Assessment and Plan    Problem List Items Addressed This Visit    None  Visit Diagnoses       Prenatal care, antepartum    -  Primary    Relevant Orders    POC Urinalysis Dipstick (Completed)            Pregnancy at 24w4d  Fetal status reassuring.  PDC scan reviewed today.  1 hour gtt, CBC, Antibody screen, and TDAP next visit. Instructions given  Discussed/encouraged TDAP vaccination after 28 weeks  Activity and Exercise discussed.  Return in about 4 weeks (around 2024) for One hour glucola.      Betzy Ramirez MD  2023

## 2023-12-20 NOTE — LETTER
"2023     Betzy Ramirez MD  [address]  [facility] [address]    Patient: Rosemary Hu   YOB: 1990   Date of Visit: 2023     Dear Betzy Ramirez MD:       Thank you for referring Rosemary Hu to me for evaluation. Below are the relevant portions of my assessment and plan of care.    If you have questions, please do not hesitate to call me. I look forward to following Rosemary along with you.         Sincerely,        Douglas A. Milligan, MD        CC: No Recipients    Milligan, Douglas A, MD  [phone]  Sign when Signing Visit  Documentation of the ultrasound findings, images, and interpretations will be available in the patient's Viewpoint report which is located in the imaging tab in chart review.    Maternal/Fetal Medicine Consult Note     Name: Rosemary Hu    : 1990     MRN: [id number]     Referring Provider: Betzy Ramirez MD    Chief Complaint  IVF, EIF    Subjective     History of Present Illness:  Rosemary Hu is a 33 y.o.  24w4d who presents today for IVF pregnancy    LAINA: Estimated Date of Delivery: 24     ROS:   As noted in HPI.     Past Medical History:   Diagnosis Date   • Anxiety    • Depression    •  product of IVF pregnancy     fresh cycle- no donors      Past Surgical History:   Procedure Laterality Date   • KNEE ACL RECONSTRUCTION     • OTHER SURGICAL HISTORY      egg retrieval X2   • WISDOM TOOTH EXTRACTION        OB History          2    Para   0    Term   0       0    AB   1    Living   0         SAB   1    IAB   0    Ectopic   0    Molar   0    Multiple   0    Live Births   0          Obstetric Comments   Fob #1 - Pregnancy #1 and #2               Objective     Vital Signs  /55   Ht 162.6 cm (64\")   Wt 75.5 kg (166 lb 6.4 oz)   LMP 2023   Estimated body mass index is 28.56 kg/m² as calculated from the following:    Height as of this encounter: 162.6 cm " "(64\").    Weight as of this encounter: 75.5 kg (166 lb 6.4 oz).    Physical Exam    Ultrasound Impression:   See Viewpoint    Assessment and Plan     Rosemary Hu is a 33 y.o.  24w4d who presents today for IVF ppregnancy    Diagnoses and all orders for this visit:    1. Pregnancy resulting from in vitro fertilization, antepartum (Primary)  Assessment & Plan:  Patient seen for pregnancy resulting from in vitro fertilization.  Recent studies have shown a slight but significant increase in congenital anomalies particularly can fetal cardiac anomalies with in vitro fertilization.  To that end a fetal echocardiogram was performed today and the fetal heart appears entirely normal with exception of it EIF seen in the left ventricle.  We still believe that the risk of congenital heart disease and the risk of trisomy 21 are very low would not recommend further detailed scans unless other complications arise.  Growth scan should be at Dr. Norwood's discretion.      2. Fetal cardiac echogenic focus, antepartum, single or unspecified fetus  Assessment & Plan:  An EIF is defined as a small (<6 mm) echogenic area in either cardiac ventricle that is as bright as the surrounding bone and visualized in at least 2 separate planes.  EIFs may appear in either cardiac ventricle, although left-sided EIFs are more common and are thought to represent microcalcifications of papillary muscles.  Since the first descriptions of EIFs as a soft marker for trisomy 21, a subsequent large body of literature has demonstrated varying positive likelihood ratios (LR) for trisomy 21, depending on the population studied (low-risk vs high-risk) and whether the EIF was isolated or in combination with other soft markers.  Overall, isolated EIFs have a positive LR ranging between 1.4 and 1.8 with a lower confidence interval extending to or lower than 1, suggesting a minimal risk.      I discussed how an echogenic intracardiac focus is not a true " birth defect as it may be seen in ~ 1/25 normal  fetuses and up to ~ 1/10  fetuses. These women should be offered noninvasive screening with an MSAFP quad screen at the appropriate gestational age or cell-free fetal DNA evaluation.  For pregnant people with a negative serum or cfDNA screening results and an isolated EIF, ACOG and SMFM currently recommend no further evaluation, as this finding is a normal variant of no clinical importance with no indication for fetal echocardiography, follow-up ultrasound imaging or  evaluation ([id number]).      3. Pregnancy, unspecified gestational age         Follow Up  No follow-ups on file.    I spent 15 minutes caring for the patient on the day of service. This included: obtaining or reviewing a separately obtained medical history, reviewing patient records, performing a medically appropriate exam and/or evaluation, counseling or educating the patient/family/caregiver, ordering medications, labs, and/or procedures and documenting such in the medical record. This does not include time spent on review and interpretation of other tests such as fetal ultrasound or the performance of other procedures such as amniocentesis or CVS.      Douglas A. Milligan, MD  Maternal Fetal Medicine, [facility]     2023

## 2023-12-20 NOTE — PROGRESS NOTES
No complaints today, Ivf was fresh cycle from this year with no donors, Next f/u with Ramirez today, NIPT neg

## 2023-12-20 NOTE — ASSESSMENT & PLAN NOTE
An EIF is defined as a small (<6 mm) echogenic area in either cardiac ventricle that is as bright as the surrounding bone and visualized in at least 2 separate planes.  EIFs may appear in either cardiac ventricle, although left-sided EIFs are more common and are thought to represent microcalcifications of papillary muscles.  Since the first descriptions of EIFs as a soft marker for trisomy 21, a subsequent large body of literature has demonstrated varying positive likelihood ratios (LR) for trisomy 21, depending on the population studied (low-risk vs high-risk) and whether the EIF was isolated or in combination with other soft markers.  Overall, isolated EIFs have a positive LR ranging between 1.4 and 1.8 with a lower confidence interval extending to or lower than 1, suggesting a minimal risk.      I discussed how an echogenic intracardiac focus is not a true birth defect as it may be seen in ~ 1/25 normal  fetuses and up to ~ 1/10  fetuses. These women should be offered noninvasive screening with an MSAFP quad screen at the appropriate gestational age or cell-free fetal DNA evaluation.  For pregnant people with a negative serum or cfDNA screening results and an isolated EIF, ACOG and SMFM currently recommend no further evaluation, as this finding is a normal variant of no clinical importance with no indication for fetal echocardiography, follow-up ultrasound imaging or  evaluation ([id number]).

## 2023-12-20 NOTE — ASSESSMENT & PLAN NOTE
Patient seen for pregnancy resulting from in vitro fertilization.  Recent studies have shown a slight but significant increase in congenital anomalies particularly can fetal cardiac anomalies with in vitro fertilization.  To that end a fetal echocardiogram was performed today and the fetal heart appears entirely normal with exception of it EIF seen in the left ventricle.  We still believe that the risk of congenital heart disease and the risk of trisomy 21 are very low would not recommend further detailed scans unless other complications arise.  Growth scan should be at Dr. Norwood's discretion.

## 2023-12-20 NOTE — PROGRESS NOTES
"Documentation of the ultrasound findings, images, and interpretations will be available in the patient's Viewpoint report which is located in the imaging tab in chart review.    Maternal/Fetal Medicine Consult Note     Name: Rosemary Hu    : 1990     MRN: [id number]     Referring Provider: Betzy Ramirez MD    Chief Complaint  IVF, EIF    Subjective     History of Present Illness:  Rosemary Hu is a 33 y.o.  24w4d who presents today for IVF pregnancy    LAINA: Estimated Date of Delivery: 24     ROS:   As noted in HPI.     Past Medical History:   Diagnosis Date    Anxiety     Depression     [place] product of IVF pregnancy     fresh cycle- no donors      Past Surgical History:   Procedure Laterality Date    KNEE ACL RECONSTRUCTION      OTHER SURGICAL HISTORY      egg retrieval X2    WISDOM TOOTH EXTRACTION        OB History          2    Para   0    Term   0       0    AB   1    Living   0         SAB   1    IAB   0    Ectopic   0    Molar   0    Multiple   0    Live Births   0          Obstetric Comments   Fob #1 - Pregnancy #1 and #2               Objective     Vital Signs  /55   Ht 162.6 cm (64\")   Wt 75.5 kg (166 lb 6.4 oz)   LMP 2023   Estimated body mass index is 28.56 kg/m² as calculated from the following:    Height as of this encounter: 162.6 cm (64\").    Weight as of this encounter: 75.5 kg (166 lb 6.4 oz).    Physical Exam    Ultrasound Impression:   See Viewpoint    Assessment and Plan     Rosemary Hu is a 33 y.o.  24w4d who presents today for IVF ppregnancy    Diagnoses and all orders for this visit:    1. Pregnancy resulting from in vitro fertilization, antepartum (Primary)  Assessment & Plan:  Patient seen for pregnancy resulting from in vitro fertilization.  Recent studies have shown a slight but significant increase in congenital anomalies particularly can fetal cardiac anomalies with in vitro fertilization.  To that end " a fetal echocardiogram was performed today and the fetal heart appears entirely normal with exception of it EIF seen in the left ventricle.  We still believe that the risk of congenital heart disease and the risk of trisomy 21 are very low would not recommend further detailed scans unless other complications arise.  Growth scan should be at Dr. Norwood's discretion.      2. Fetal cardiac echogenic focus, antepartum, single or unspecified fetus  Assessment & Plan:  An EIF is defined as a small (<6 mm) echogenic area in either cardiac ventricle that is as bright as the surrounding bone and visualized in at least 2 separate planes.  EIFs may appear in either cardiac ventricle, although left-sided EIFs are more common and are thought to represent microcalcifications of papillary muscles.  Since the first descriptions of EIFs as a soft marker for trisomy 21, a subsequent large body of literature has demonstrated varying positive likelihood ratios (LR) for trisomy 21, depending on the population studied (low-risk vs high-risk) and whether the EIF was isolated or in combination with other soft markers.  Overall, isolated EIFs have a positive LR ranging between 1.4 and 1.8 with a lower confidence interval extending to or lower than 1, suggesting a minimal risk.      I discussed how an echogenic intracardiac focus is not a true birth defect as it may be seen in ~ 1/25 normal  fetuses and up to ~ 1/10  fetuses. These women should be offered noninvasive screening with an MSAFP quad screen at the appropriate gestational age or cell-free fetal DNA evaluation.  For pregnant people with a negative serum or cfDNA screening results and an isolated EIF, ACOG and SMFM currently recommend no further evaluation, as this finding is a normal variant of no clinical importance with no indication for fetal echocardiography, follow-up ultrasound imaging or  evaluation ([id number]).      3. Pregnancy, unspecified gestational  age         Follow Up  No follow-ups on file.    I spent 15 minutes caring for the patient on the day of service. This included: obtaining or reviewing a separately obtained medical history, reviewing patient records, performing a medically appropriate exam and/or evaluation, counseling or educating the patient/family/caregiver, ordering medications, labs, and/or procedures and documenting such in the medical record. This does not include time spent on review and interpretation of other tests such as fetal ultrasound or the performance of other procedures such as amniocentesis or CVS.      Douglas A. Milligan, MD  Maternal Fetal Medicine, [facility]     2023

## 2024-01-18 ENCOUNTER — ROUTINE PRENATAL (OUTPATIENT)
Dept: OBSTETRICS AND GYNECOLOGY | Facility: CLINIC | Age: 34
End: 2024-01-18
Payer: COMMERCIAL

## 2024-01-18 VITALS — BODY MASS INDEX: 29.66 KG/M2 | WEIGHT: 172.8 LBS | SYSTOLIC BLOOD PRESSURE: 114 MMHG | DIASTOLIC BLOOD PRESSURE: 68 MMHG

## 2024-01-18 DIAGNOSIS — Z3A.28 28 WEEKS GESTATION OF PREGNANCY: ICD-10-CM

## 2024-01-18 DIAGNOSIS — O35.BXX0 FETAL CARDIAC ECHOGENIC FOCUS, ANTEPARTUM, SINGLE OR UNSPECIFIED FETUS: ICD-10-CM

## 2024-01-18 DIAGNOSIS — R00.2 PALPITATIONS: ICD-10-CM

## 2024-01-18 DIAGNOSIS — F41.8 SITUATIONAL ANXIETY: ICD-10-CM

## 2024-01-18 DIAGNOSIS — O09.819 PREGNANCY RESULTING FROM IN VITRO FERTILIZATION, ANTEPARTUM: Primary | ICD-10-CM

## 2024-01-18 LAB
GLUCOSE UR STRIP-MCNC: NEGATIVE MG/DL
PROT UR STRIP-MCNC: NEGATIVE MG/DL

## 2024-01-18 NOTE — PROGRESS NOTES
"      OB FOLLOW UP  CC- Here for care of pregnancy        Rosemary Hu is a 33 y.o.  28w5d patient being seen today for her obstetrical follow up. Patient reports insomnia, round ligament pain & back pain. Pt denies taking pain medication for relief. Pt \"just deals with it\". Pt takes Unisom approximately once a week. She reports feeling groggy the next morning after taking it so uses it sparingly.. Pt also reports concerns with diastasis since noticing it at the gym while doing abdominal exercises.    Patient undergoing Glucola testing today. She is due for her testing at 4:05.       MBT: A+  Rhogam:  not indicated  28 week packet: reviewed with patient , counseled on fetal movement , pediatrician list reviewed, breast pump discussed, and childbirth classes reviewed  TDAP: given today  Flu Status: Already given in current flu season  Ultrasound Today: No    Her prenatal care is complicated by (and status) :    Patient Active Problem List   Diagnosis    Pregnancy resulting from in vitro fertilization, antepartum    Fetal cardiac echogenic focus, antepartum    Pregnancy         ROS -   Patient Reports : see above  Patient Denies: Loss of Fluid, Vaginal Spotting, Vision Changes, Headaches, Nausea , Vomiting , Contractions, and Epigastric pain  Fetal Movement : normal    The additional following portions of the patient's history were reviewed and updated as appropriate: allergies, current medications, past family history, past medical history, past social history, past surgical history, and problem list.    I have reviewed and agree with the HPI, ROS, and historical information as entered above. Betzy Ramirez MD      /68   Wt 78.4 kg (172 lb 12.8 oz)   LMP 2023   BMI 29.66 kg/m²         EXAM:     Prenatal Vitals  BP: 114/68  Weight: 78.4 kg (172 lb 12.8 oz)   Fetal Heart Rate: pos               Urine Glucose Read-only: Negative  Urine Protein Read-only: Negative         Assessment and " Plan    Problem List Items Addressed This Visit          Gravid and     Pregnancy resulting from in vitro fertilization, antepartum - Primary    Relevant Orders    POC Urinalysis Dipstick (Completed)    Fetal cardiac echogenic focus, antepartum    Relevant Orders    US Ob Follow Up Transabdominal Approach    Pregnancy    Relevant Orders    CBC (No Diff)    Gestational Screen 1 Hr ([facility])    Antibody Screen    RPR    TSH    T4, Free     Other Visit Diagnoses       Palpitations        Relevant Orders    Ambulatory Referral to Cardiology    Situational anxiety                Pregnancy at 28w5d  1 hr Glucola, CBC, RPR, and antibody screen today  and TDAP given today  Fetal movement/PTL or Labor precautions  Activity and Exercise discussed.  Sit anxiety largely work related.  Discussed options and she will consider Buspar or Zoloft.  Declined Vistaril.  She does have a therapist she talks to regularly.  Continued palpitations - refer to Cardiology.  Check thyroid today.  Return in about 4 weeks (around 2/15/2024) for US with Next Visit.        Betzy Ramirez MD  2024

## 2024-01-19 LAB
BLD GP AB SCN SERPL QL: NEGATIVE
ERYTHROCYTE [DISTWIDTH] IN BLOOD BY AUTOMATED COUNT: 11.7 % (ref 11.7–15.4)
GLUCOSE 1H P 50 G GLC PO SERPL-MCNC: 82 MG/DL (ref 70–139)
HCT VFR BLD AUTO: 31.2 % (ref 34–46.6)
HGB BLD-MCNC: 10.9 G/DL (ref 11.1–15.9)
MCH RBC QN AUTO: 32.1 PG (ref 26.6–33)
MCHC RBC AUTO-ENTMCNC: 34.9 G/DL (ref 31.5–35.7)
MCV RBC AUTO: 92 FL (ref 79–97)
PLATELET # BLD AUTO: 195 X10E3/UL (ref 150–450)
RBC # BLD AUTO: 3.4 X10E6/UL (ref 3.77–5.28)
RPR SER QL: NON REACTIVE
T4 FREE SERPL-MCNC: 0.98 NG/DL (ref 0.82–1.77)
TSH SERPL DL<=0.005 MIU/L-ACNC: 0.92 UIU/ML (ref 0.45–4.5)
WBC # BLD AUTO: 12 X10E3/UL (ref 3.4–10.8)

## 2024-01-24 ENCOUNTER — OFFICE VISIT (OUTPATIENT)
Dept: CARDIOLOGY | Facility: CLINIC | Age: 34
End: 2024-01-24
Payer: COMMERCIAL

## 2024-01-24 VITALS
BODY MASS INDEX: 28.82 KG/M2 | DIASTOLIC BLOOD PRESSURE: 66 MMHG | OXYGEN SATURATION: 99 % | HEART RATE: 66 BPM | WEIGHT: 173 LBS | HEIGHT: 65 IN | SYSTOLIC BLOOD PRESSURE: 104 MMHG

## 2024-01-24 DIAGNOSIS — R00.2 PALPITATIONS: Primary | ICD-10-CM

## 2024-01-24 PROCEDURE — 93000 ELECTROCARDIOGRAM COMPLETE: CPT | Performed by: INTERNAL MEDICINE

## 2024-01-24 PROCEDURE — 99203 OFFICE O/P NEW LOW 30 MIN: CPT | Performed by: INTERNAL MEDICINE

## 2024-01-24 RX ORDER — FERROUS SULFATE 137(45) MG
TABLET, EXTENDED RELEASE ORAL 2 TIMES DAILY
COMMUNITY
Start: 2024-01-19

## 2024-01-24 NOTE — PROGRESS NOTES
"[facility] Cardiology  Consultation H&P  Rosemary Hu  1990  [phone]  There is no work phone number on file..    VISIT DATE:  01/24/2024    PCP: Ariana Rust PA-C  [address]  [facility] [id number]    CC:  Chief Complaint   Patient presents with    Palpitations     Palpitations during Preg. 28 weeks        Previous cardiac studies and procedures:  December 2024 ECG monitor, 5-day: Rare PACs and PVCs, rare episodes of brief nonsustained atrial tachycardia.    ASSESSMENT:   Diagnosis Plan   1. Palpitations              PLAN:  Palpitations: No high risk clinical features.  Consistent with symptomatic PACs and PVCs.  Limited burden of arrhythmia.  No clinical suspicion for underlying structural or functional heart disease.  Agree with treatment of underlying iron deficiency anemia which may help ameliorate symptoms.  Suspect symptoms will resolve following pregnancy.  Offered reassurance regarding the benign condition of this diagnosis.  No further cardiac testing or medication adjustment recommended at this time.    History of Present Illness   33-year-old female who is currently 28 weeks into her pregnancy.  She developed intermittent palpitations in September.  Would describe intermittent skipped heartbeat sensations and brief butterfly sensations in her chest.  Occurred at rest.  No obvious triggers.  Was able to exercise without difficulty.  Denied chest pain, dyspnea, presyncope or syncope.  Reviewed recent laboratory evaluation.  Benign baseline twelve-lead EKG.  Normal cardiopulmonary exam.  Personally reviewed rhythm strips of recent ambulatory ECG monitor with patient today.    PHYSICAL EXAMINATION:  Vitals:    01/24/24 0821   BP: 104/66   BP Location: Left arm   Patient Position: Sitting   Pulse: 66   SpO2: 99%   Weight: 78.5 kg (173 lb)   Height: 165.1 cm (65\")     General Appearance:    Alert, cooperative, no distress, appears stated age   Head:    " "Normocephalic, without obvious abnormality, atraumatic   Eyes:    conjunctiva/corneas clear, EOM's intact, fundi     benign, both eyes   Ears:    Normal TM's and external ear canals, both ears   Nose:   Nares normal, septum midline, mucosa normal, no drainage    or sinus tenderness   Throat:   Lips, mucosa, and tongue normal; teeth and gums normal   Neck:   Supple, symmetrical, trachea midline, no adenopathy;     thyroid:  no enlargement/tenderness/nodules; no carotid    bruit or JVD   Back:     Symmetric, no curvature, ROM normal, no CVA tenderness   Lungs:     Clear to auscultation bilaterally, respirations unlabored   Chest Wall:    No tenderness or deformity    Heart:    Regular rate and rhythm, S1 and S2 normal, no murmur, rub   or gallop, normal carotid impulse bilaterally without bruit.   Abdomen:     Soft, non-tender, bowel sounds active all four quadrants,     no masses, no organomegaly   Extremities:   Extremities normal, atraumatic, no cyanosis or edema   Pulses:   2+ and symmetric all extremities   Skin:   Skin color, texture, turgor normal, no rashes or lesions   Lymph nodes:   Cervical, supraclavicular, and axillary nodes normal   Neurologic:   normal strength, sensation intact     throughout       Diagnostic Data:    ECG 12 Lead    Date/Time: 1/24/2024 8:42 AM  Performed by: Ralph Cabral III, MD    Authorized by: Ralph Cabral III, MD  Previous ECG: no previous ECG available  Rhythm: sinus rhythm    Clinical impression: normal ECG        No results found for: \"CHLPL\", \"TRIG\", \"HDL\", \"LDLDIRECT\"  Lab Results   Component Value Date    GLUCOSE 97 08/05/2023    BUN 15 08/05/2023    CREATININE 1.04 (H) 08/05/2023     (L) 08/05/2023    K 3.9 08/05/2023     08/05/2023    CO2 22.0 08/05/2023     No results found for: \"HGBA1C\"  Lab Results   Component Value Date    WBC 12.0 (H) 01/18/2024    HGB 10.9 (L) 01/18/2024    HCT 31.2 (L) 01/18/2024     01/18/2024       PROBLEM LIST:  Patient " Active Problem List   Diagnosis    Pregnancy resulting from in vitro fertilization, antepartum    Fetal cardiac echogenic focus, antepartum    Pregnancy    Palpitations       PAST MEDICAL HX  Past Medical History:   Diagnosis Date    Anxiety     Depression     Arbela product of IVF pregnancy     fresh cycle- no donors       Allergies  No Known Allergies    Current Medications    Current Outpatient Medications:     ascorbic acid (VITAMIN C) 500 MG tablet, Take 1 tablet by mouth Daily., Disp: , Rfl:     aspirin 81 MG chewable tablet, Chew 1 tablet Daily., Disp: , Rfl:     Cholecalciferol 25 MCG (1000 UT) tablet, Take 1 tablet by mouth Daily., Disp: , Rfl:     doxylamine (UNISOM) 25 MG tablet, Take 1 tablet by mouth At Night As Needed for Sleep., Disp: , Rfl:     Ferrous Sulfate ER (Slow Fe) 142 (45 Fe) MG tablet controlled-release, Take  by mouth 2 (Two) Times a Day., Disp: , Rfl:     Prenatal Vit-Fe Fumarate-FA (Prenatal 19) 29-1 MG chewable tablet, , Disp: , Rfl:     vitamin B-6 (PYRIDOXINE) 50 MG tablet, Take 1 tablet by mouth Daily As Needed., Disp: , Rfl:     Zinc 50 MG tablet, Take  by mouth., Disp: , Rfl:          ROS  ROS      SOCIAL HX  Social History     Socioeconomic History    Marital status:    Tobacco Use    Smoking status: Never     Passive exposure: Never    Smokeless tobacco: Never   Vaping Use    Vaping Use: Never used   Substance and Sexual Activity    Alcohol use: Not Currently     Comment: socially    Drug use: No    Sexual activity: Yes       FAMILY HX  Family History   Problem Relation Age of Onset    Hypertension Father     Uterine cancer Neg Hx     Colon cancer Neg Hx     Breast cancer Neg Hx     Ovarian cancer Neg Hx              Ralph Cabral III, MD, FACC

## 2024-02-20 ENCOUNTER — ROUTINE PRENATAL (OUTPATIENT)
Dept: OBSTETRICS AND GYNECOLOGY | Facility: CLINIC | Age: 34
End: 2024-02-20
Payer: COMMERCIAL

## 2024-02-20 VITALS — WEIGHT: 176.4 LBS | DIASTOLIC BLOOD PRESSURE: 76 MMHG | BODY MASS INDEX: 29.35 KG/M2 | SYSTOLIC BLOOD PRESSURE: 112 MMHG

## 2024-02-20 DIAGNOSIS — O09.819 PREGNANCY RESULTING FROM IN VITRO FERTILIZATION, ANTEPARTUM: ICD-10-CM

## 2024-02-20 DIAGNOSIS — O35.BXX0 FETAL CARDIAC ECHOGENIC FOCUS, ANTEPARTUM, SINGLE OR UNSPECIFIED FETUS: ICD-10-CM

## 2024-02-20 DIAGNOSIS — Z34.93 PRENATAL CARE IN THIRD TRIMESTER: Primary | ICD-10-CM

## 2024-02-20 LAB
GLUCOSE UR STRIP-MCNC: NEGATIVE MG/DL
PROT UR STRIP-MCNC: NEGATIVE MG/DL

## 2024-02-20 NOTE — PROGRESS NOTES
OB FOLLOW UP  CC- Here for care of pregnancy        Rosemary Hu is a 33 y.o.  33w3d patient being seen today for her obstetrical follow up visit. Patient reports no complaints.     Her prenatal care is complicated by (and status) :    Patient Active Problem List   Diagnosis    Pregnancy resulting from in vitro fertilization, antepartum    Fetal cardiac echogenic focus, antepartum    Pregnancy    Palpitations       Flu Status: Already given in current flu season  TDAP status: received at last visit  Rhogam status: was not indicated  28 week labs: Reviewed and Labs show anemia. She is taking additional iron supplement.  Ultrasound Today: Yes,  bpm. Cephalic. EFW 5lb 0 oz.  Non Stress Test: No.      ROS -   Patient Denies: Loss of Fluid, Vaginal Spotting, Vision Changes, Headaches, Nausea , Vomiting , Contractions, Epigastric pain, and skin itching  Fetal Movement : normal  All other systems reviewed and are negative.       The additional following portions of the patient's history were reviewed and updated as appropriate: allergies and current medications.    I have reviewed and agree with the HPI, ROS, and historical information as entered above. Betzy Ramirez MD      /76   Wt 80 kg (176 lb 6.4 oz)   LMP 2023   BMI 29.35 kg/m²         EXAM:     Prenatal Vitals  BP: 112/76  Weight: 80 kg (176 lb 6.4 oz)   Fetal Heart Rate: 129               Urine Glucose Read-only: Negative  Urine Protein Read-only: Negative           Assessment and Plan    Problem List Items Addressed This Visit          Gravid and     Pregnancy resulting from in vitro fertilization, antepartum    Fetal cardiac echogenic focus, antepartum     Other Visit Diagnoses       Prenatal care in third trimester    -  Primary    Relevant Orders    POC Urinalysis Dipstick (Completed)            Pregnancy at 33w3d  Fetal status reassuring.  28 week labs reviewed.    Activity and Exercise discussed.  Fetal  movement/PTL or Labor precautions  Return in about 2 weeks (around 3/5/2024).    Betzy Ramirez MD  02/20/2024

## 2024-03-05 ENCOUNTER — ROUTINE PRENATAL (OUTPATIENT)
Dept: OBSTETRICS AND GYNECOLOGY | Facility: CLINIC | Age: 34
End: 2024-03-05
Payer: COMMERCIAL

## 2024-03-05 VITALS — BODY MASS INDEX: 30.32 KG/M2 | DIASTOLIC BLOOD PRESSURE: 78 MMHG | WEIGHT: 182.2 LBS | SYSTOLIC BLOOD PRESSURE: 108 MMHG

## 2024-03-05 DIAGNOSIS — Z34.93 PRENATAL CARE IN THIRD TRIMESTER: Primary | ICD-10-CM

## 2024-03-05 LAB
GLUCOSE UR STRIP-MCNC: NEGATIVE MG/DL
PROT UR STRIP-MCNC: NEGATIVE MG/DL

## 2024-03-05 PROCEDURE — 0502F SUBSEQUENT PRENATAL CARE: CPT | Performed by: OBSTETRICS & GYNECOLOGY

## 2024-03-05 NOTE — PROGRESS NOTES
OB FOLLOW UP  CC- Here for care of pregnancy        Rosemary Hu is a 33 y.o.  35w3d patient being seen today for her obstetrical follow up visit. Patient reports heartburn and round ligament pain. Patient is not taking medication for heartburn.     Her prenatal care is complicated by (and status) :   Patient Active Problem List   Diagnosis    Pregnancy resulting from in vitro fertilization, antepartum    Fetal cardiac echogenic focus, antepartum    Pregnancy    Palpitations       Flu Status: Already given in current flu season  Ultrasound Today: No  Non Stress Test: No    ROS -   Patient Denies: Loss of Fluid, Vaginal Spotting, Vision Changes, Headaches, Nausea , Vomiting , Contractions, Epigastric pain, and Skin itching  Fetal Movement : normal  All other systems reviewed and are negative.       The additional following portions of the patient's history were reviewed and updated as appropriate: allergies and current medications.    I have reviewed and agree with the HPI, ROS, and historical information as entered above. Betzy Ramirez MD      /78   Wt 82.6 kg (182 lb 3.2 oz)   LMP 2023   BMI 30.32 kg/m²       EXAM:     Prenatal Vitals  BP: 108/78  Weight: 82.6 kg (182 lb 3.2 oz)   Fetal Heart Rate: pos               Urine Glucose Read-only: Negative  Urine Protein Read-only: Negative           Assessment and Plan    Problem List Items Addressed This Visit    None  Visit Diagnoses       Prenatal care in third trimester    -  Primary    Relevant Orders    POC Urinalysis Dipstick (Completed)            Pregnancy at 35w3d  Fetal status reassuring.   Activity and Exercise discussed.  Fetal movement/PTL or Labor precautions  GBS next visit  Return in about 1 week (around 3/12/2024).    Betzy Ramirez MD  2024

## 2024-03-07 ENCOUNTER — HOSPITAL ENCOUNTER (OUTPATIENT)
Facility: HOSPITAL | Age: 34
Discharge: HOME | End: 2024-03-07
Attending: OBSTETRICS & GYNECOLOGY | Admitting: OBSTETRICS & GYNECOLOGY
Payer: COMMERCIAL

## 2024-03-07 ENCOUNTER — APPOINTMENT (OUTPATIENT)
Dept: WOMENS IMAGING | Facility: HOSPITAL | Age: 34
End: 2024-03-07
Payer: COMMERCIAL

## 2024-03-07 VITALS
DIASTOLIC BLOOD PRESSURE: 77 MMHG | HEART RATE: 65 BPM | TEMPERATURE: 97.4 F | SYSTOLIC BLOOD PRESSURE: 114 MMHG | RESPIRATION RATE: 16 BRPM | OXYGEN SATURATION: 98 %
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
## 2024-03-07 PROCEDURE — G0378 HOSPITAL OBSERVATION PER HR: HCPCS

## 2024-03-07 PROCEDURE — 76816 OB US FOLLOW-UP PER FETUS: CPT

## 2024-03-07 PROCEDURE — 76816 OB US FOLLOW-UP PER FETUS: CPT | Performed by: OBSTETRICS & GYNECOLOGY

## 2024-03-07 PROCEDURE — 76819 FETAL BIOPHYS PROFIL W/O NST: CPT | Performed by: OBSTETRICS & GYNECOLOGY

## 2024-03-07 PROCEDURE — 59025 FETAL NON-STRESS TEST: CPT

## 2024-03-07 PROCEDURE — 99221 1ST HOSP IP/OBS SF/LOW 40: CPT | Performed by: OBSTETRICS & GYNECOLOGY

## 2024-03-07 PROCEDURE — 59025 FETAL NON-STRESS TEST: CPT | Performed by: OBSTETRICS & GYNECOLOGY

## 2024-03-07 PROCEDURE — 76820 UMBILICAL ARTERY ECHO: CPT | Performed by: OBSTETRICS & GYNECOLOGY

## 2024-03-07 PROCEDURE — G0463 HOSPITAL OUTPT CLINIC VISIT: HCPCS

## 2024-03-07 PROCEDURE — 76819 FETAL BIOPHYS PROFIL W/O NST: CPT

## 2024-03-07 RX ORDER — MECOBALAMIN 5000 MCG
15 TABLET,DISINTEGRATING ORAL DAILY
COMMUNITY

## 2024-03-07 NOTE — H&P
Rosemary Chavez  1990  [id number]  [id number]    CC: fell  HPI:  Patient is 33 y.o. female   currently at 35w5d presents after slipping and falling on buttocks or back at ~0550.  Pt did not injure herself as best she knows.  Since fall, pt denies contractions, bleeding, or leaking.  BPNC to date except IVF pregnancy.     PMH:  Current meds: PNV, zinc, B6  Illnesses: none  Surgeries: right ACL repair, oral surg  Allergies: NKDA    Past OB History:       OB History    Para Term  AB Living   2 0 0 0 1 0   SAB IAB Ectopic Molar Multiple Live Births   1 0 0 0 0 0      # Outcome Date GA Lbr Bebo/2nd Weight Sex Type Anes PTL Lv   2 Current            2023     Biochemical         Obstetric Comments   Fob #1 - Pregnancy #1 and #2            SH: tob neg , EtOH neg, drugs neg    General ROS: all systems reviewed and negative.   All other systems reviewed and are negative.      Physical Examination: General appearance - alert, well appearing, and in no distress  Vital signs - /77   Pulse 65   Temp 97.4 °F (36.3 °C) (Oral)   Resp 16   LMP 2023   SpO2 98%   HEENT: normocephalic, atraumatic,oropharynx clear, appearance of ears and nose normal  Neck - supple, no significant adenopathy, no thyromegaly  Lymphatics - no palpable lymphadenopathy in the neck or groin, no hepatosplenomegaly  Chest - clear to auscultation, no wheezes, rales or rhonchi, respiratory effort non-labored  Heart - normal rate, regular rhythm, no murmurs, rubs, clicks or gallops, no JVD, no lower extremity edema  Abdomen - soft, nontender, nondistended, no masses, no hepatosplenomegaly  no rebound tenderness noted, bowel sounds normal  Extremities - no pedal edema noted, no calf tend  Skin -warm and dry, normal coloration and turgor, no rashes, no suspicious skin lesions noted      Fetal monitoring: indication slip and fall, onset 0632 , offset 0715 , baseline 120 , mod BTB variability , multiple accels (15 X  15), no decels, occas contractions, interpretation reactive NST    Radiology     Assessment 1)IUP 35 5/7 weeks   2)slip and fall w/o direct abd trauma- rare contractions, good FM   3)IVF preg    Plan 1)observe with prolonged fetal monitoring    Arsalan Valero MD  3/7/2024  07:14 EST                                                                      ismael jesus

## 2024-03-07 NOTE — NURSING NOTE
Pt discharged home per MD order. RN instructed pt to return to labor connell if any of the following occur: decreased fetal movement, vaginal bleeding, leaking of fluid. Pt verbalized understanding. Pt ambulating off unit in stable condition with spouse and all personal belongings.

## 2024-03-07 NOTE — DISCHARGE INSTRUCTIONS
Return to labor connell if any of the following occur: decreased fetal movement, vaginal bleeding, leaking of fluid

## 2024-03-11 ENCOUNTER — ROUTINE PRENATAL (OUTPATIENT)
Dept: OBSTETRICS AND GYNECOLOGY | Facility: CLINIC | Age: 34
End: 2024-03-11
Payer: COMMERCIAL

## 2024-03-11 VITALS — SYSTOLIC BLOOD PRESSURE: 110 MMHG | DIASTOLIC BLOOD PRESSURE: 68 MMHG | BODY MASS INDEX: 29.79 KG/M2 | WEIGHT: 179 LBS

## 2024-03-11 DIAGNOSIS — Z34.93 PRENATAL CARE IN THIRD TRIMESTER: Primary | ICD-10-CM

## 2024-03-11 DIAGNOSIS — O35.BXX0 FETAL CARDIAC ECHOGENIC FOCUS, ANTEPARTUM, SINGLE OR UNSPECIFIED FETUS: ICD-10-CM

## 2024-03-11 DIAGNOSIS — O09.819 PREGNANCY RESULTING FROM IN VITRO FERTILIZATION, ANTEPARTUM: ICD-10-CM

## 2024-03-11 DIAGNOSIS — Z3A.36 36 WEEKS GESTATION OF PREGNANCY: ICD-10-CM

## 2024-03-11 LAB
GLUCOSE UR STRIP-MCNC: NEGATIVE MG/DL
PROT UR STRIP-MCNC: NEGATIVE MG/DL

## 2024-03-11 PROCEDURE — 0502F SUBSEQUENT PRENATAL CARE: CPT | Performed by: NURSE PRACTITIONER

## 2024-03-11 NOTE — PROGRESS NOTES
OB FOLLOW UP  CC- Here for care of pregnancy        Rosemary Hu is a 33 y.o.  36w2d patient being seen today for her obstetrical follow up visit. Patient reports no complaints.     Her prenatal care is complicated by (and status) :  Patient Active Problem List   Diagnosis    Pregnancy resulting from in vitro fertilization, antepartum    Fetal cardiac echogenic focus, antepartum    Pregnancy    Palpitations    Fall on same level from slipping, tripping and stumbling without subsequent striking against object, initial encounter       GBS Status: Done Today. She is not allergic to PCN.    No Known Allergies         Her Delivery Plan is:  wait until at least due date, maybe the week after. Patient is willing to be induced before 42 weeks.     US today: no  Non Stress Test: No.    ROS -   Patient Denies: Loss of Fluid, Vaginal Spotting, Vision Changes, Headaches, Nausea , Vomiting , Contractions, Epigastric pain, and skin itching  Fetal Movement : normal  All other systems reviewed and are negative.       The additional following portions of the patient's history were reviewed and updated as appropriate: allergies and current medications.    I have reviewed and agree with the HPI, ROS, and historical information as entered above. Blue Yi, APRN      EXAM:     Prenatal Vitals  BP: 110/68  Weight: 81.2 kg (179 lb)   Fetal Heart Rate: 165       Dilation/Effacement/Station  Dilation: 1  Effacement (%): 60      Urine Glucose Read-only: Negative  Urine Protein Read-only: Negative       Assessment and Plan    Problem List Items Addressed This Visit       Pregnancy resulting from in vitro fertilization, antepartum    Fetal cardiac echogenic focus, antepartum    Pregnancy     Other Visit Diagnoses       Prenatal care in third trimester    -  Primary    Relevant Orders    POC Urinalysis Dipstick (Completed)    Group B Streptococcus Culture - Swab, Vaginal/Rectum            Pregnancy at 36w2d  GBS  today  Fetal status reassuring.   Reviewed Pre-eclampsia signs/symptoms  Discussed IOL options with patient. Pt. desires IOL after 40 weeks.   Delivery options reviewed with patient  Signs of labor reviewed  Kick counts reviewed  Activity and Exercise discussed.  Return in about 1 week (around 3/18/2024) for ELENA Yi, APRN  03/11/2024

## 2024-03-12 ENCOUNTER — LAB (OUTPATIENT)
Dept: LAB | Facility: HOSPITAL | Age: 34
End: 2024-03-12
Payer: COMMERCIAL

## 2024-03-12 PROCEDURE — 87081 CULTURE SCREEN ONLY: CPT | Performed by: NURSE PRACTITIONER

## 2024-03-14 LAB — BACTERIA SPEC AEROBE CULT: ABNORMAL

## 2024-03-19 ENCOUNTER — TELEPHONE (OUTPATIENT)
Dept: OBSTETRICS AND GYNECOLOGY | Facility: CLINIC | Age: 34
End: 2024-03-19

## 2024-03-19 ENCOUNTER — ROUTINE PRENATAL (OUTPATIENT)
Dept: OBSTETRICS AND GYNECOLOGY | Facility: CLINIC | Age: 34
End: 2024-03-19
Payer: COMMERCIAL

## 2024-03-19 VITALS — SYSTOLIC BLOOD PRESSURE: 108 MMHG | WEIGHT: 181.4 LBS | DIASTOLIC BLOOD PRESSURE: 68 MMHG | BODY MASS INDEX: 30.19 KG/M2

## 2024-03-19 DIAGNOSIS — Z34.90 PRENATAL CARE, ANTEPARTUM: Primary | ICD-10-CM

## 2024-03-19 LAB
GLUCOSE UR STRIP-MCNC: NEGATIVE MG/DL
PROT UR STRIP-MCNC: NEGATIVE MG/DL

## 2024-03-19 PROCEDURE — 0502F SUBSEQUENT PRENATAL CARE: CPT | Performed by: OBSTETRICS & GYNECOLOGY

## 2024-03-19 NOTE — PROGRESS NOTES
OB FOLLOW UP  CC- Here for care of pregnancy        Rosemary Hu is a 33 y.o.  37w3d patient being seen today for her obstetrical follow up visit. Patient reports no complaints.     Pt went to labor [facility] on 3/7 after a fall. Pt was discharged home after after reactive NST.     Her prenatal care is complicated by (and status) :   Patient Active Problem List   Diagnosis    Pregnancy resulting from in vitro fertilization, antepartum    Fetal cardiac echogenic focus, antepartum    Pregnancy    Palpitations    Fall on same level from slipping, tripping and stumbling without subsequent striking against object, initial encounter       GBS Status:   Group B Strep Culture   Date Value Ref Range Status   2024 Streptococcus agalactiae (Group B) (A)  Final     Comment:       This organism is considered to be universally susceptible to penicillin.  No further antibiotic testing will be performed. If Clindamycin or Erythromycin is the drug of choice, notify the laboratory within 7 days to request susceptibility testing.         No Known Allergies       Flu Status: Already given in current flu season  Her Delivery Plan is:  Desires after 41 weeks     US today: no  Non Stress Test: No.    ROS -   Patient Denies: Loss of Fluid, Vaginal Spotting, Vision Changes, Headaches, Nausea , Vomiting , Contractions, Epigastric pain, and skin itching  Fetal Movement : normal  All other systems reviewed and are negative.       The additional following portions of the patient's history were reviewed and updated as appropriate: allergies and current medications.    I have reviewed and agree with the HPI, ROS, and historical information as entered above. Betzy Ramirez MD        EXAM:     Prenatal Vitals  BP: 108/68  Weight: 82.3 kg (181 lb 6.4 oz)   Fetal Heart Rate: pos              Urine Glucose Read-only: Negative  Urine Protein Read-only: Negative           Assessment and Plan    Problem List Items Addressed This Visit     None  Visit Diagnoses       Prenatal care, antepartum    -  Primary    Relevant Orders    POC Urinalysis Dipstick (Completed)            Pregnancy at 37w3d  Fetal status reassuring.   Reviewed Pre-eclampsia signs/symptoms  Delivery options reviewed with patient and desires induction after 40 weeks.  Signs of labor reviewed  Kick counts reviewed  Activity and Exercise discussed.  Return in about 1 week (around 3/26/2024).    Betzy Ramirez MD  03/19/2024

## 2024-03-28 ENCOUNTER — ROUTINE PRENATAL (OUTPATIENT)
Dept: OBSTETRICS AND GYNECOLOGY | Facility: CLINIC | Age: 34
End: 2024-03-28
Payer: COMMERCIAL

## 2024-03-28 VITALS — WEIGHT: 183 LBS | DIASTOLIC BLOOD PRESSURE: 72 MMHG | BODY MASS INDEX: 30.45 KG/M2 | SYSTOLIC BLOOD PRESSURE: 102 MMHG

## 2024-03-28 DIAGNOSIS — Z34.93 PRENATAL CARE IN THIRD TRIMESTER: Primary | ICD-10-CM

## 2024-03-28 DIAGNOSIS — O35.BXX0 FETAL CARDIAC ECHOGENIC FOCUS, ANTEPARTUM, SINGLE OR UNSPECIFIED FETUS: ICD-10-CM

## 2024-03-28 DIAGNOSIS — O09.819 PREGNANCY RESULTING FROM IN VITRO FERTILIZATION, ANTEPARTUM: ICD-10-CM

## 2024-03-28 LAB
GLUCOSE UR STRIP-MCNC: NEGATIVE MG/DL
PROT UR STRIP-MCNC: NEGATIVE MG/DL

## 2024-03-28 PROCEDURE — 0502F SUBSEQUENT PRENATAL CARE: CPT | Performed by: OBSTETRICS & GYNECOLOGY

## 2024-03-28 NOTE — PROGRESS NOTES
OB FOLLOW UP  CC- Here for care of pregnancy        Rosemary Hu is a 33 y.o.  38w5d patient being seen today for her obstetrical follow up visit. Patient reports no complaints.     Her prenatal care is complicated by (and status) :   Patient Active Problem List   Diagnosis    Pregnancy resulting from in vitro fertilization, antepartum    Fetal cardiac echogenic focus, antepartum    Pregnancy    Palpitations    Fall on same level from slipping, tripping and stumbling without subsequent striking against object, initial encounter       GBS Status: POS  Group B Strep Culture   Date Value Ref Range Status   2024 Streptococcus agalactiae (Group B) (A)  Final     Comment:       This organism is considered to be universally susceptible to penicillin.  No further antibiotic testing will be performed. If Clindamycin or Erythromycin is the drug of choice, notify the laboratory within 7 days to request susceptibility testing.         No Known Allergies       Flu Status: Already given in current flu season  Her Delivery Plan is: Desires IOL at 39wks. Scheduled  4/10/2024  US today: no  Non Stress Test: No.    ROS -   Patient Denies: Loss of Fluid, Vaginal Spotting, Vision Changes, Headaches, Nausea , Vomiting , Contractions, Epigastric pain, and skin itching  Fetal Movement : normal  All other systems reviewed and are negative.       The additional following portions of the patient's history were reviewed and updated as appropriate: allergies and current medications.    I have reviewed and agree with the HPI, ROS, and historical information as entered above. Betzy Ramirez MD        EXAM:     Prenatal Vitals  BP: 102/72  Weight: 83 kg (183 lb)   Fetal Heart Rate: pos          1-2/50    Urine Glucose Read-only: Negative  Urine Protein Read-only: Negative           Assessment and Plan    Problem List Items Addressed This Visit          Gravid and     Pregnancy resulting from in vitro  Letter faxed as requested   fertilization, antepartum    Fetal cardiac echogenic focus, antepartum     Other Visit Diagnoses       Prenatal care in third trimester    -  Primary    Relevant Orders    POC Urinalysis Dipstick (Completed)    US Fetal Biophysical Profile;Without Non-Stress Testing            Pregnancy at 38w5d  Fetal status reassuring.   Reviewed Pre-eclampsia signs/symptoms  Delivery options reviewed with patient  Signs of labor reviewed  Kick counts reviewed  Activity and Exercise discussed.  Return in about 1 week (around 4/4/2024), or Needs BPP with me 4/9/24.    Betzy Ramirez MD  03/28/2024

## 2024-03-31 ENCOUNTER — HOSPITAL ENCOUNTER (INPATIENT)
Facility: HOSPITAL | Age: 34
LOS: 3 days | Discharge: HOME | End: 2024-04-03
Attending: OBSTETRICS & GYNECOLOGY | Admitting: OBSTETRICS & GYNECOLOGY
Payer: COMMERCIAL

## 2024-03-31 PROBLEM — Z37.9 NORMAL LABOR: Status: ACTIVE | Noted: 2024-03-31

## 2024-03-31 RX ORDER — MORPHINE SULFATE 2 MG/ML
2 INJECTION, SOLUTION INTRAMUSCULAR; INTRAVENOUS
Status: DISCONTINUED | OUTPATIENT
Start: 2024-03-31 | End: 2024-04-01 | Stop reason: HOSPADM

## 2024-03-31 RX ORDER — SODIUM CHLORIDE 0.9 % (FLUSH) 0.9 %
10 SYRINGE (ML) INJECTION AS NEEDED
Status: DISCONTINUED | OUTPATIENT
Start: 2024-03-31 | End: 2024-04-01 | Stop reason: HOSPADM

## 2024-03-31 RX ORDER — LIDOCAINE HYDROCHLORIDE 10 MG/ML
0.5 INJECTION, SOLUTION EPIDURAL; INFILTRATION; INTRACAUDAL; PERINEURAL ONCE AS NEEDED
Status: DISCONTINUED | OUTPATIENT
Start: 2024-03-31 | End: 2024-04-01 | Stop reason: HOSPADM

## 2024-03-31 RX ORDER — ACETAMINOPHEN 325 MG/1
650 TABLET ORAL EVERY 4 HOURS PRN
Status: DISCONTINUED | OUTPATIENT
Start: 2024-03-31 | End: 2024-04-01 | Stop reason: HOSPADM

## 2024-03-31 RX ORDER — OXYTOCIN/0.9 % SODIUM CHLORIDE 30/500 ML
2-20 PLASTIC BAG, INJECTION (ML) INTRAVENOUS
Status: DISCONTINUED | OUTPATIENT
Start: 2024-04-01 | End: 2024-04-01 | Stop reason: HOSPADM

## 2024-03-31 RX ORDER — OXYTOCIN/0.9 % SODIUM CHLORIDE 30/500 ML
999 PLASTIC BAG, INJECTION (ML) INTRAVENOUS ONCE
Status: COMPLETED | OUTPATIENT
Start: 2024-04-01 | End: 2024-04-01

## 2024-03-31 RX ORDER — PENICILLIN G 3000000 [IU]/50ML
3 INJECTION, SOLUTION INTRAVENOUS EVERY 4 HOURS
Status: DISCONTINUED | OUTPATIENT
Start: 2024-04-01 | End: 2024-04-01 | Stop reason: HOSPADM

## 2024-03-31 RX ORDER — ONDANSETRON 4 MG/1
4 TABLET, ORALLY DISINTEGRATING ORAL EVERY 6 HOURS PRN
Status: DISCONTINUED | OUTPATIENT
Start: 2024-03-31 | End: 2024-04-01 | Stop reason: HOSPADM

## 2024-03-31 RX ORDER — ACETAMINOPHEN 325 MG/1
650 TABLET ORAL EVERY 4 HOURS PRN
Status: DISCONTINUED | OUTPATIENT
Start: 2024-03-31 | End: 2024-04-01 | Stop reason: SDUPTHER

## 2024-03-31 RX ORDER — SODIUM CHLORIDE 0.9 % (FLUSH) 0.9 %
10 SYRINGE (ML) INJECTION EVERY 12 HOURS SCHEDULED
Status: DISCONTINUED | OUTPATIENT
Start: 2024-04-01 | End: 2024-04-01 | Stop reason: HOSPADM

## 2024-03-31 RX ORDER — PROMETHAZINE HYDROCHLORIDE 12.5 MG/1
12.5 TABLET ORAL EVERY 6 HOURS PRN
Status: DISCONTINUED | OUTPATIENT
Start: 2024-03-31 | End: 2024-04-01 | Stop reason: HOSPADM

## 2024-03-31 RX ORDER — FENTANYL CITRATE 50 UG/ML
50 INJECTION, SOLUTION INTRAMUSCULAR; INTRAVENOUS
Status: DISCONTINUED | OUTPATIENT
Start: 2024-03-31 | End: 2024-04-01 | Stop reason: HOSPADM

## 2024-03-31 RX ORDER — OXYTOCIN/0.9 % SODIUM CHLORIDE 30/500 ML
250 PLASTIC BAG, INJECTION (ML) INTRAVENOUS CONTINUOUS
Status: DISPENSED | OUTPATIENT
Start: 2024-04-01 | End: 2024-04-01

## 2024-03-31 RX ORDER — SODIUM CHLORIDE 9 MG/ML
40 INJECTION, SOLUTION INTRAVENOUS AS NEEDED
Status: DISCONTINUED | OUTPATIENT
Start: 2024-03-31 | End: 2024-04-01 | Stop reason: HOSPADM

## 2024-03-31 RX ORDER — OMEGA-3S/DHA/EPA/FISH OIL/D3 300MG-1000
400 CAPSULE ORAL DAILY
COMMUNITY
End: 2024-04-03

## 2024-03-31 RX ORDER — ONDANSETRON 2 MG/ML
4 INJECTION INTRAMUSCULAR; INTRAVENOUS EVERY 6 HOURS PRN
Status: DISCONTINUED | OUTPATIENT
Start: 2024-03-31 | End: 2024-04-01 | Stop reason: HOSPADM

## 2024-03-31 RX ORDER — IBUPROFEN 600 MG/1
600 TABLET ORAL EVERY 6 HOURS PRN
Status: DISCONTINUED | OUTPATIENT
Start: 2024-03-31 | End: 2024-04-01 | Stop reason: HOSPADM

## 2024-03-31 RX ORDER — SODIUM CHLORIDE, SODIUM LACTATE, POTASSIUM CHLORIDE, CALCIUM CHLORIDE 600; 310; 30; 20 MG/100ML; MG/100ML; MG/100ML; MG/100ML
125 INJECTION, SOLUTION INTRAVENOUS CONTINUOUS
Status: DISCONTINUED | OUTPATIENT
Start: 2024-04-01 | End: 2024-04-03 | Stop reason: HOSPADM

## 2024-03-31 RX ORDER — PROMETHAZINE HYDROCHLORIDE 12.5 MG/1
12.5 SUPPOSITORY RECTAL EVERY 6 HOURS PRN
Status: DISCONTINUED | OUTPATIENT
Start: 2024-03-31 | End: 2024-04-01 | Stop reason: HOSPADM

## 2024-03-31 RX ORDER — MAGNESIUM CARB/ALUMINUM HYDROX 105-160MG
30 TABLET,CHEWABLE ORAL ONCE
Status: DISCONTINUED | OUTPATIENT
Start: 2024-04-01 | End: 2024-04-01 | Stop reason: HOSPADM

## 2024-04-01 ENCOUNTER — ANESTHESIA EVENT (OUTPATIENT)
Dept: LABOR AND DELIVERY | Facility: HOSPITAL | Age: 34
End: 2024-04-01
Payer: COMMERCIAL

## 2024-04-01 ENCOUNTER — ANESTHESIA (OUTPATIENT)
Dept: LABOR AND DELIVERY | Facility: HOSPITAL | Age: 34
End: 2024-04-01
Payer: COMMERCIAL

## 2024-04-01 LAB
ABO GROUP BLD: NORMAL
ABO GROUP BLD: NORMAL
ALP SERPL-CCNC: 157 U/L (ref 39–117)
ALT SERPL W P-5'-P-CCNC: 11 U/L (ref 1–33)
AST SERPL-CCNC: 28 U/L (ref 1–32)
ATMOSPHERIC PRESS: ABNORMAL MM[HG]
ATMOSPHERIC PRESS: ABNORMAL MM[HG]
BASE EXCESS BLDCOA CALC-SCNC: -16.1 MMOL/L (ref 0–2)
BASE EXCESS BLDCOV CALC-SCNC: -12.1 MMOL/L (ref 0–2)
BDY SITE: ABNORMAL
BDY SITE: ABNORMAL
BILIRUB SERPL-MCNC: <0.2 MG/DL (ref 0–1.2)
BLD GP AB SCN SERPL QL: NEGATIVE
BODY TEMPERATURE: 37
BODY TEMPERATURE: 37
CO2 BLDA-SCNC: 20.5 MMOL/L (ref 22–33)
CO2 BLDA-SCNC: 20.6 MMOL/L (ref 22–33)
CREAT SERPL-MCNC: 0.61 MG/DL (ref 0.57–1)
DEPRECATED RDW RBC AUTO: 42.6 FL (ref 37–54)
EPAP: 0
EPAP: 0
ERYTHROCYTE [DISTWIDTH] IN BLOOD BY AUTOMATED COUNT: 12.8 % (ref 12.3–15.4)
HCO3 BLDCOA-SCNC: 18 MMOL/L (ref 16.9–20.5)
HCO3 BLDCOV-SCNC: 18.7 MMOL/L (ref 18.6–21.4)
HCT VFR BLD AUTO: 35 % (ref 34–46.6)
HGB BLD-MCNC: 12.2 G/DL (ref 12–15.9)
HGB BLDA-MCNC: 16.5 G/DL (ref 14–18)
HGB BLDA-MCNC: 16.9 G/DL (ref 14–18)
INHALED O2 CONCENTRATION: 21 %
INHALED O2 CONCENTRATION: 21 %
IPAP: 0
IPAP: 0
LDH SERPL-CCNC: 251 U/L (ref 135–214)
Lab: ABNORMAL
Lab: ABNORMAL
MCH RBC QN AUTO: 31.9 PG (ref 26.6–33)
MCHC RBC AUTO-ENTMCNC: 34.9 G/DL (ref 31.5–35.7)
MCV RBC AUTO: 91.6 FL (ref 79–97)
MODALITY: ABNORMAL
MODALITY: ABNORMAL
NOTE: 0
NOTIFIED BY: ABNORMAL
NOTIFIED BY: ABNORMAL
NOTIFIED WHO: ABNORMAL
NOTIFIED WHO: ABNORMAL
PAW @ PEAK INSP FLOW SETTING VENT: 0 CMH2O
PAW @ PEAK INSP FLOW SETTING VENT: 0 CMH2O
PCO2 BLDCOA: 82.1 MMHG (ref 43.3–54.9)
PCO2 BLDCOV: 61.3 MM HG (ref 28–40)
PH BLDCOA: 6.95 PH UNITS (ref 7.22–7.3)
PH BLDCOV: 7.09 PH UNITS (ref 7.31–7.37)
PLATELET # BLD AUTO: 199 10*3/MM3 (ref 140–450)
PMV BLD AUTO: 8.8 FL (ref 6–12)
PO2 BLDCOA: 33.9 MMHG (ref 11.5–43.3)
PO2 BLDCOV: 33.2 MM HG (ref 21–31)
RBC # BLD AUTO: 3.82 10*6/MM3 (ref 3.77–5.28)
RH BLD: POSITIVE
RH BLD: POSITIVE
SAO2 % BLDCOA: 46.7 %
SAO2 % BLDCOA: ABNORMAL %
SAO2 % BLDCOV: 55.3 %
T PALLIDUM IGG SER QL: NORMAL
T&S EXPIRATION DATE: NORMAL
TOTAL RATE: 0 BREATHS/MINUTE
TOTAL RATE: 0 BREATHS/MINUTE
URATE SERPL-MCNC: 4.8 MG/DL (ref 2.4–5.7)
VENTILATOR MODE: ABNORMAL
VENTILATOR MODE: ABNORMAL
WBC NRBC COR # BLD AUTO: 10.86 10*3/MM3 (ref 3.4–10.8)

## 2024-04-01 PROCEDURE — 86780 TREPONEMA PALLIDUM: CPT | Performed by: OBSTETRICS & GYNECOLOGY

## 2024-04-01 PROCEDURE — 85027 COMPLETE CBC AUTOMATED: CPT | Performed by: OBSTETRICS & GYNECOLOGY

## 2024-04-01 PROCEDURE — 84075 ASSAY ALKALINE PHOSPHATASE: CPT | Performed by: OBSTETRICS & GYNECOLOGY

## 2024-04-01 PROCEDURE — 59400 OBSTETRICAL CARE: CPT | Performed by: OBSTETRICS & GYNECOLOGY

## 2024-04-01 PROCEDURE — C1755 CATHETER, INTRASPINAL: HCPCS | Performed by: ANESTHESIOLOGY

## 2024-04-01 PROCEDURE — 84450 TRANSFERASE (AST) (SGOT): CPT | Performed by: OBSTETRICS & GYNECOLOGY

## 2024-04-01 PROCEDURE — 86850 RBC ANTIBODY SCREEN: CPT | Performed by: OBSTETRICS & GYNECOLOGY

## 2024-04-01 PROCEDURE — C1755 CATHETER, INTRASPINAL: HCPCS

## 2024-04-01 PROCEDURE — 51702 INSERT TEMP BLADDER CATH: CPT

## 2024-04-01 PROCEDURE — 86901 BLOOD TYPING SEROLOGIC RH(D): CPT

## 2024-04-01 PROCEDURE — 25010000002 PENICILLIN G POTASSIUM PER 600000 UNITS: Performed by: OBSTETRICS & GYNECOLOGY

## 2024-04-01 PROCEDURE — 25010000002 FENTANYL CITRATE (PF) 50 MCG/ML SOLUTION: Performed by: ANESTHESIOLOGY

## 2024-04-01 PROCEDURE — 25810000003 LACTATED RINGERS PER 1000 ML: Performed by: OBSTETRICS & GYNECOLOGY

## 2024-04-01 PROCEDURE — 82805 BLOOD GASES W/O2 SATURATION: CPT

## 2024-04-01 PROCEDURE — 25010000002 ROPIVACAINE PER 1 MG: Performed by: ANESTHESIOLOGY

## 2024-04-01 PROCEDURE — 59025 FETAL NON-STRESS TEST: CPT

## 2024-04-01 PROCEDURE — 82247 BILIRUBIN TOTAL: CPT | Performed by: OBSTETRICS & GYNECOLOGY

## 2024-04-01 PROCEDURE — 84550 ASSAY OF BLOOD/URIC ACID: CPT | Performed by: OBSTETRICS & GYNECOLOGY

## 2024-04-01 PROCEDURE — 86900 BLOOD TYPING SEROLOGIC ABO: CPT

## 2024-04-01 PROCEDURE — 86901 BLOOD TYPING SEROLOGIC RH(D): CPT | Performed by: OBSTETRICS & GYNECOLOGY

## 2024-04-01 PROCEDURE — 0KQM0ZZ REPAIR PERINEUM MUSCLE, OPEN APPROACH: ICD-10-PCS | Performed by: OBSTETRICS & GYNECOLOGY

## 2024-04-01 PROCEDURE — 86900 BLOOD TYPING SEROLOGIC ABO: CPT | Performed by: OBSTETRICS & GYNECOLOGY

## 2024-04-01 PROCEDURE — 83615 LACTATE (LD) (LDH) ENZYME: CPT | Performed by: OBSTETRICS & GYNECOLOGY

## 2024-04-01 PROCEDURE — 84460 ALANINE AMINO (ALT) (SGPT): CPT | Performed by: OBSTETRICS & GYNECOLOGY

## 2024-04-01 PROCEDURE — 82565 ASSAY OF CREATININE: CPT | Performed by: OBSTETRICS & GYNECOLOGY

## 2024-04-01 RX ORDER — FENTANYL CITRATE 50 UG/ML
INJECTION, SOLUTION INTRAMUSCULAR; INTRAVENOUS AS NEEDED
Status: DISCONTINUED | OUTPATIENT
Start: 2024-04-01 | End: 2024-04-01 | Stop reason: SURG

## 2024-04-01 RX ORDER — FAMOTIDINE 10 MG/ML
20 INJECTION, SOLUTION INTRAVENOUS ONCE AS NEEDED
Status: DISCONTINUED | OUTPATIENT
Start: 2024-04-01 | End: 2024-04-01 | Stop reason: HOSPADM

## 2024-04-01 RX ORDER — ACETAMINOPHEN 325 MG/1
650 TABLET ORAL EVERY 6 HOURS PRN
Status: DISCONTINUED | OUTPATIENT
Start: 2024-04-01 | End: 2024-04-03 | Stop reason: HOSPADM

## 2024-04-01 RX ORDER — HYDROCODONE BITARTRATE AND ACETAMINOPHEN 5; 325 MG/1; MG/1
1 TABLET ORAL EVERY 8 HOURS PRN
Status: DISCONTINUED | OUTPATIENT
Start: 2024-04-01 | End: 2024-04-03 | Stop reason: HOSPADM

## 2024-04-01 RX ORDER — DOCUSATE SODIUM 100 MG/1
100 CAPSULE, LIQUID FILLED ORAL 2 TIMES DAILY
Status: DISCONTINUED | OUTPATIENT
Start: 2024-04-01 | End: 2024-04-03 | Stop reason: HOSPADM

## 2024-04-01 RX ORDER — LIDOCAINE HYDROCHLORIDE AND EPINEPHRINE 15; 5 MG/ML; UG/ML
INJECTION, SOLUTION EPIDURAL AS NEEDED
Status: DISCONTINUED | OUTPATIENT
Start: 2024-04-01 | End: 2024-04-01 | Stop reason: SURG

## 2024-04-01 RX ORDER — OXYTOCIN/0.9 % SODIUM CHLORIDE 30/500 ML
125 PLASTIC BAG, INJECTION (ML) INTRAVENOUS ONCE AS NEEDED
Status: DISCONTINUED | OUTPATIENT
Start: 2024-04-01 | End: 2024-04-03 | Stop reason: HOSPADM

## 2024-04-01 RX ORDER — CITRIC ACID/SODIUM CITRATE 334-500MG
30 SOLUTION, ORAL ORAL ONCE
Status: DISCONTINUED | OUTPATIENT
Start: 2024-04-01 | End: 2024-04-01 | Stop reason: HOSPADM

## 2024-04-01 RX ORDER — IBUPROFEN 600 MG/1
600 TABLET ORAL EVERY 6 HOURS SCHEDULED
Status: DISCONTINUED | OUTPATIENT
Start: 2024-04-01 | End: 2024-04-03 | Stop reason: HOSPADM

## 2024-04-01 RX ORDER — LIDOCAINE HYDROCHLORIDE 20 MG/ML
INJECTION, SOLUTION EPIDURAL; INFILTRATION; INTRACAUDAL; PERINEURAL AS NEEDED
Status: DISCONTINUED | OUTPATIENT
Start: 2024-04-01 | End: 2024-04-01 | Stop reason: SURG

## 2024-04-01 RX ORDER — EPHEDRINE SULFATE 5 MG/ML
INJECTION INTRAVENOUS
Status: DISCONTINUED
Start: 2024-04-01 | End: 2024-04-01 | Stop reason: WASHOUT

## 2024-04-01 RX ORDER — ONDANSETRON 2 MG/ML
4 INJECTION INTRAMUSCULAR; INTRAVENOUS ONCE AS NEEDED
Status: DISCONTINUED | OUTPATIENT
Start: 2024-04-01 | End: 2024-04-01 | Stop reason: HOSPADM

## 2024-04-01 RX ORDER — HYDROCODONE BITARTRATE AND ACETAMINOPHEN 10; 325 MG/1; MG/1
1 TABLET ORAL EVERY 4 HOURS PRN
Status: DISCONTINUED | OUTPATIENT
Start: 2024-04-01 | End: 2024-04-03 | Stop reason: HOSPADM

## 2024-04-01 RX ORDER — BISACODYL 10 MG
10 SUPPOSITORY, RECTAL RECTAL DAILY PRN
Status: DISCONTINUED | OUTPATIENT
Start: 2024-04-02 | End: 2024-04-03 | Stop reason: HOSPADM

## 2024-04-01 RX ORDER — HYDROCORTISONE 25 MG/G
1 CREAM TOPICAL AS NEEDED
Status: DISCONTINUED | OUTPATIENT
Start: 2024-04-01 | End: 2024-04-03 | Stop reason: HOSPADM

## 2024-04-01 RX ORDER — METOCLOPRAMIDE HYDROCHLORIDE 5 MG/ML
10 INJECTION INTRAMUSCULAR; INTRAVENOUS ONCE AS NEEDED
Status: DISCONTINUED | OUTPATIENT
Start: 2024-04-01 | End: 2024-04-01 | Stop reason: HOSPADM

## 2024-04-01 RX ORDER — SODIUM CHLORIDE 0.9 % (FLUSH) 0.9 %
1-10 SYRINGE (ML) INJECTION AS NEEDED
Status: DISCONTINUED | OUTPATIENT
Start: 2024-04-01 | End: 2024-04-03 | Stop reason: HOSPADM

## 2024-04-01 RX ORDER — DIPHENHYDRAMINE HYDROCHLORIDE 50 MG/ML
12.5 INJECTION INTRAMUSCULAR; INTRAVENOUS EVERY 8 HOURS PRN
Status: DISCONTINUED | OUTPATIENT
Start: 2024-04-01 | End: 2024-04-01 | Stop reason: HOSPADM

## 2024-04-01 RX ORDER — EPHEDRINE SULFATE 5 MG/ML
10 INJECTION INTRAVENOUS
Status: DISCONTINUED | OUTPATIENT
Start: 2024-04-01 | End: 2024-04-01 | Stop reason: HOSPADM

## 2024-04-01 RX ORDER — MISOPROSTOL 200 UG/1
TABLET ORAL
Status: COMPLETED
Start: 2024-04-01 | End: 2024-04-01

## 2024-04-01 RX ORDER — ROPIVACAINE HYDROCHLORIDE 2 MG/ML
15 INJECTION, SOLUTION EPIDURAL; INFILTRATION; PERINEURAL CONTINUOUS
Status: DISCONTINUED | OUTPATIENT
Start: 2024-04-01 | End: 2024-04-03 | Stop reason: HOSPADM

## 2024-04-01 RX ADMIN — LIDOCAINE HYDROCHLORIDE AND EPINEPHRINE 3 ML: 15; 5 INJECTION, SOLUTION EPIDURAL at 01:53

## 2024-04-01 RX ADMIN — IBUPROFEN 600 MG: 600 TABLET, FILM COATED ORAL at 20:13

## 2024-04-01 RX ADMIN — ACETAMINOPHEN 650 MG: 325 TABLET ORAL at 17:02

## 2024-04-01 RX ADMIN — LIDOCAINE HYDROCHLORIDE 6 ML: 20 INJECTION, SOLUTION EPIDURAL; INFILTRATION; INTRACAUDAL; PERINEURAL at 01:53

## 2024-04-01 RX ADMIN — Medication 999 ML/HR: at 03:00

## 2024-04-01 RX ADMIN — Medication 250 ML/HR: at 03:15

## 2024-04-01 RX ADMIN — ACETAMINOPHEN 650 MG: 325 TABLET ORAL at 11:29

## 2024-04-01 RX ADMIN — DOCUSATE SODIUM 100 MG: 100 CAPSULE, LIQUID FILLED ORAL at 08:07

## 2024-04-01 RX ADMIN — IBUPROFEN 600 MG: 600 TABLET, FILM COATED ORAL at 13:38

## 2024-04-01 RX ADMIN — ACETAMINOPHEN 650 MG: 325 TABLET ORAL at 04:28

## 2024-04-01 RX ADMIN — SODIUM CHLORIDE 5 MILLION UNITS: 900 INJECTION INTRAVENOUS at 00:43

## 2024-04-01 RX ADMIN — SODIUM CHLORIDE, POTASSIUM CHLORIDE, SODIUM LACTATE AND CALCIUM CHLORIDE 125 ML/HR: 600; 310; 30; 20 INJECTION, SOLUTION INTRAVENOUS at 00:43

## 2024-04-01 RX ADMIN — WITCH HAZEL: 500 SOLUTION RECTAL; TOPICAL at 06:47

## 2024-04-01 RX ADMIN — Medication: at 06:47

## 2024-04-01 RX ADMIN — IBUPROFEN 600 MG: 600 TABLET, FILM COATED ORAL at 06:47

## 2024-04-01 RX ADMIN — DOCUSATE SODIUM 100 MG: 100 CAPSULE, LIQUID FILLED ORAL at 20:13

## 2024-04-01 RX ADMIN — Medication 1 APPLICATION: at 06:47

## 2024-04-01 RX ADMIN — FENTANYL CITRATE 100 MCG: 50 INJECTION, SOLUTION INTRAMUSCULAR; INTRAVENOUS at 01:53

## 2024-04-01 RX ADMIN — ROPIVACAINE HYDROCHLORIDE 15 ML/HR: 2 INJECTION, SOLUTION EPIDURAL; INFILTRATION at 01:53

## 2024-04-01 RX ADMIN — MISOPROSTOL 800 MCG: 200 TABLET ORAL at 03:15

## 2024-04-01 NOTE — L&D DELIVERY NOTE
2024    Patient:Rosemary Hu    MR#:[id number]    Vaginal Delivery Note  33 y.o. yo female  at 39w2d      Normal labor       Delivery     Delivery:      YOB: 2023    Time of Birth: 2:56 AM      Anesthesia:      Delivering clinician: Radha Murrell    Forceps?   Yes  Forcep Delivery   Forceps type:     Application location:     Number of pulls:     Total application time:     Applied by: DR. SOTELO    Failed?        Vacuum? No    Shoulder dystocia present: No          Infant    Findings: male  infant     Infant observations: Weight: 3595 g (7 lb 14.8 oz)     Observations/Comments:        Apgars: 7  @ 1 minute /    8  @ 5 minutes         Placenta, Cord, and Fluid    Placenta delivered    at  2024  3:07 AM     Cord:   present.   Nuchal Cord?  no   Cord blood obtained:     Cord gases obtained:      Cord gas results: Pending         Repair    Episiotomy: Yes - median   Lacerations: Nothing further    Suture used for repair: 2-0 and 3-0 Vicryl     Estimated Blood Loss:  200 mls.         Complications  none    Disposition  Mother to Mother Baby/Postpartum  in stable condition currently.  Baby to remains with mom  in stable condition currently.    Description of Delivery    On my arrival about 2:50, heart tones were 90's-110's and recovering between contractions.  She was  on my arrival.  Patient pushed well with dense epidural x another 3 contractions.  Then, I chose to cut episiotomy and with next contraction, she delivered head from straight OP  presentation.  Shoulders delivered easily. Baby was vigorous.  Nose and mouth was bulb suctioned and baby was placed on mother's abdomen.  Cord was clamped and cut after at least a minute delay.  2nd degree Repair was done next.  Placenta delivered easily and with pitocin bolus, there was no uterine atony.       Radha Murrell MD  24  03:26 EDT

## 2024-04-01 NOTE — ANESTHESIA PROCEDURE NOTES
Labor Epidural      Patient reassessed immediately prior to procedure    Patient location during procedure: OB  Start Time: 4/1/2024 1:43 AM  Stop Time: 4/1/2024 2:07 AM  Performed By  Anesthesiologist: Alo Schwartz MD  Preanesthetic Checklist  Completed: patient identified, IV checked, site marked, risks and benefits discussed, surgical consent, monitors and equipment checked, pre-op evaluation and timeout performed  Additional Notes  DPE  Prep:  Pt Position:sitting  Sterile Tech:cap, gloves, mask and sterile barrier  Prep:povidone-iodine 7.5% surgical scrub  Monitoring:blood pressure monitoring  Epidural Block Procedure:  Approach:midline  Guidance:landmark technique  Location:L3-L4  Needle Type:Tuohy  Needle Gauge:17 G  Loss of Resistance Medium: air  Loss of Resistance: 5cm  Cath Depth at skin:15 cm  Paresthesia: none  Aspiration:negative  Test Dose:negative  Post Assessment:  Dressing:occlusive dressing applied and secured with tape  Pt Tolerance:patient tolerated the procedure well with no apparent complications  Complications:no

## 2024-04-01 NOTE — LACTATION NOTE
Patient has recently attempted breastfeeding, and is currently sleeping per floor RN.  Floor RN to call for lactation when patient is awake.  Lactation to follow up.

## 2024-04-01 NOTE — LACTATION NOTE
24 1330   Maternal Information   Date of Referral 24   Person Making Referral lactation consultant  (newly postpartum)   Maternal Reason for Referral no prior breastfeeding experience   Infant Reason for Referral  infant   Maternal Assessment   Breast Size Issue none   Breast Shape Bilateral:;round  (small)   Breast Density Bilateral:;soft   Nipples Bilateral:;short  (wide)   Left Nipple Symptoms intact;nontender   Right Nipple Symptoms intact;nontender   Maternal Infant Feeding   Maternal Emotional State receptive;relaxed   Infant Positioning clutch/football  (right and left side)   Signs of Milk Transfer none noted  (infant sleepy at the breast, no feeding cues noted.)   Latch Assistance full assistance needed  (went over both cross cradle and football hold)   Support Person Involvement verbally supports mother   Milk Expression/Equipment   Breast Pump Type double electric, personal  (Medela)   Breast Pump Flange Type hard   Breast Pumping   Breast Pumping Interventions other (see comments);post-feed pumping encouraged  (encouraged to start pumping at 24 hours if infant is missing feeds.  To pump for any short or missed feed)     Courtesy visit with 1st time breastfeeding mother that desires to breastfeed.  Went over basic breastfeeding information and provided written materials with a QR code to  and breastpump QR codes.  Encouraged mother to look at the hospital booklet starting on page 35 for breastfeeding information. Encouraged attempting to breastfeed every 3 hours or more often with feeding cues, using stimulation to encourage high quality milk transfer. All questions answered at this time, PRN Lactation Consultant/Clinic contact encouraged.

## 2024-04-01 NOTE — ANESTHESIA POSTPROCEDURE EVALUATION
Patient: oRsemary Hu    Procedure Summary       Date: 04/01/24 Room / Location:     Anesthesia Start: 0143 Anesthesia Stop: 0307    Procedure: LABOR ANALGESIA Diagnosis:     Scheduled Providers:  Provider: Alo Schwartz MD    Anesthesia Type: epidural ASA Status: 2 - Emergent            Anesthesia Type: epidural    Vitals  Vitals Value Taken Time   /63 04/01/24 0550   Temp 98.3 °F (36.8 °C) 04/01/24 0550   Pulse 78 04/01/24 0550   Resp 16 04/01/24 0550   SpO2     Vitals shown include unfiled device data.        Post Anesthesia Care and Evaluation    Patient location during evaluation: bedside  Patient participation: complete - patient participated  Level of consciousness: awake and alert  Pain management: adequate    Airway patency: patent  Anesthetic complications: No anesthetic complications    Cardiovascular status: acceptable  Respiratory status: acceptable  Hydration status: acceptable  Post Neuraxial Block status: Motor and sensory function returned to baseline and No signs or symptoms of PDPH

## 2024-04-01 NOTE — PROGRESS NOTES
Labourist:      Called to room emergently due to fetal bradycardia.   She had received her epidural recently, but BP were WNL for her.   Immediately after the epidural she was 9/90/0.    It was approximately 15 minutes after that exam that her nurse called out for assistance.   She was now complete and +1.  With pushing effort she was +2.    She was straight OP.    FHTS were in the upper 50s to 60s and would briefly recover to the 80-90s.     Closed Franklin forceps were called for and easily applied.  Over the next couple contractions, in addition to maternal effort, gentle traction was applied with the forceps with good descent and bringing the infant down to nearly .   To reduce the risk of maternal trauma the forceps were removed.    She continued to have fetal bradycardia, but mostly around 80-90s and low 100s.   Rosemary continued to push with contractions, but was not initially making great progress.    At one point FHTS were again in the 60-70s.   I applied a Kiwi vacuum, but with the OP position and the copious amount of hair on the infant, great suction was not achieved and it popped off fairly quickly.    FHTs seemed to recover staying more in the 90s and low 100s.   Dr. Murrell was now present and assumed management of the delivery.  See her delivery note for further details

## 2024-04-02 LAB
BASOPHILS # BLD AUTO: 0.06 10*3/MM3 (ref 0–0.2)
BASOPHILS NFR BLD AUTO: 0.6 % (ref 0–1.5)
DEPRECATED RDW RBC AUTO: 43.8 FL (ref 37–54)
EOSINOPHIL # BLD AUTO: 0.3 10*3/MM3 (ref 0–0.4)
EOSINOPHIL NFR BLD AUTO: 2.9 % (ref 0.3–6.2)
ERYTHROCYTE [DISTWIDTH] IN BLOOD BY AUTOMATED COUNT: 13.2 % (ref 12.3–15.4)
HCT VFR BLD AUTO: 26.1 % (ref 34–46.6)
HGB BLD-MCNC: 8.9 G/DL (ref 12–15.9)
IMM GRANULOCYTES # BLD AUTO: 0.06 10*3/MM3 (ref 0–0.05)
IMM GRANULOCYTES NFR BLD AUTO: 0.6 % (ref 0–0.5)
LYMPHOCYTES # BLD AUTO: 2.27 10*3/MM3 (ref 0.7–3.1)
LYMPHOCYTES NFR BLD AUTO: 22.2 % (ref 19.6–45.3)
MCH RBC QN AUTO: 31.3 PG (ref 26.6–33)
MCHC RBC AUTO-ENTMCNC: 34.1 G/DL (ref 31.5–35.7)
MCV RBC AUTO: 91.9 FL (ref 79–97)
MONOCYTES # BLD AUTO: 0.97 10*3/MM3 (ref 0.1–0.9)
MONOCYTES NFR BLD AUTO: 9.5 % (ref 5–12)
NEUTROPHILS NFR BLD AUTO: 6.58 10*3/MM3 (ref 1.7–7)
NEUTROPHILS NFR BLD AUTO: 64.2 % (ref 42.7–76)
NRBC BLD AUTO-RTO: 0 /100 WBC (ref 0–0.2)
PLATELET # BLD AUTO: 135 10*3/MM3 (ref 140–450)
PMV BLD AUTO: 8.6 FL (ref 6–12)
RBC # BLD AUTO: 2.84 10*6/MM3 (ref 3.77–5.28)
WBC NRBC COR # BLD AUTO: 10.24 10*3/MM3 (ref 3.4–10.8)

## 2024-04-02 PROCEDURE — 0503F POSTPARTUM CARE VISIT: CPT

## 2024-04-02 PROCEDURE — 85025 COMPLETE CBC W/AUTO DIFF WBC: CPT | Performed by: OBSTETRICS & GYNECOLOGY

## 2024-04-02 RX ORDER — LIDOCAINE HYDROCHLORIDE 10 MG/ML
1 INJECTION, SOLUTION INFILTRATION; PERINEURAL ONCE
Status: DISCONTINUED | OUTPATIENT
Start: 2024-04-02 | End: 2024-04-02

## 2024-04-02 RX ADMIN — IBUPROFEN 600 MG: 600 TABLET, FILM COATED ORAL at 11:28

## 2024-04-02 RX ADMIN — DOCUSATE SODIUM 100 MG: 100 CAPSULE, LIQUID FILLED ORAL at 08:00

## 2024-04-02 RX ADMIN — IBUPROFEN 600 MG: 600 TABLET, FILM COATED ORAL at 18:09

## 2024-04-02 RX ADMIN — DOCUSATE SODIUM 100 MG: 100 CAPSULE, LIQUID FILLED ORAL at 20:10

## 2024-04-02 RX ADMIN — IBUPROFEN 600 MG: 600 TABLET, FILM COATED ORAL at 01:10

## 2024-04-02 NOTE — PROGRESS NOTES
Postpartum Progress Note    Patient name: Rosemary Hu  YOB: 1990   MRN: [id number]  Referring Provider: Betzy Ramirez MD  Admission Date: 3/31/2024  Date of Service: 2024    ID: 33 y.o.     Diagnosis:   S/p vaginal delivery     Normal labor       Subjective:      No complaints.  Moderate lochia.  Ambulating, voiding, tolerating diet.  Pain well controlled.  The patient is currently breastfeeding.   This baby is a boy.  Desire circ.    Objective:      Vital signs:  Vital Signs Range for the last 24 hours  Temperature: Temp:  [97.7 °F (36.5 °C)-98.6 °F (37 °C)] 97.7 °F (36.5 °C)   Temp Source: Temp src: Oral   BP: BP: (107-121)/(55-72) 117/68   Pulse: Heart Rate:  [66-83] 66   Respirations: Resp:  [16-18] 16   Weight: 83 kg (183 lb)     General: Alert & oriented x4, in no apparent distress  Abdomen: soft, nontender  Uterus: firm, nontender  Extremities: nontender; no edema      Labs:  Lab Results   Component Value Date    WBC 10.24 2024    HGB 8.9 (L) 2024    HCT 26.1 (L) 2024    MCV 91.9 2024     (L) 2024     Results from last 7 days   Lab Units 24  0702   ABO TYPING  A   RH TYPING  Positive     External Prenatal Results       Pregnancy Outside Results - Transcribed From Office Records - See Scanned Records For Details       Test Value Date Time    ABO  A  24 0702    Rh  Positive  24 0702    Antibody Screen  Negative  24 0030       Negative  24 1645       Negative  23 1215    Varicella IgG       Rubella  1.39 index 23 1215    Hgb  8.9 g/dL 24 0633       12.2 g/dL 24 0030       10.9 g/dL 24 1645       13.0 g/dL 23 1215       12.8 g/dL 23 1519    Hct  26.1 % 24 0633       35.0 % 24 0030       31.2 % 24 1645       38.4 % 23 1215       37.2 % 23 1519    Glucose Fasting GTT       Glucose Tolerance Test 1 hour       Glucose Tolerance Test 3 hour        Gonorrhea (discrete)  Negative  09/28/23     Chlamydia (discrete)  Negative  09/28/23     RPR  Non Reactive  01/18/24 1645       Non Reactive  09/01/23 1215    VDRL       Syphilis Antibody       HBsAg  Negative  09/01/23 1215    Herpes Simplex Virus PCR       Herpes Simplex VIrus Culture       HIV  Non Reactive  09/01/23 1215    Hep C RNA Quant PCR       Hep C Antibody  Non Reactive  09/01/23 1215    AFP       Group B Strep  Streptococcus agalactiae (Group B)  03/12/24 1849    GBS Susceptibility to Clindamycin       GBS Susceptibility to Erythromycin       Fetal Fibronectin       Genetic Testing, Maternal Blood                 Drug Screening       Test Value Date Time    Urine Drug Screen       Amphetamine Screen  Negative ng/mL 09/01/23 1215    Barbiturate Screen  Negative ng/mL 09/01/23 1215    Benzodiazepine Screen  Negative ng/mL 09/01/23 1215    Methadone Screen  Negative ng/mL 09/01/23 1215    Phencyclidine Screen  Negative ng/mL 09/01/23 1215    Opiates Screen       THC Screen       Cocaine Screen       Propoxyphene Screen  Negative ng/mL 09/01/23 1215    Buprenorphine Screen       Methamphetamine Screen       Oxycodone Screen       Tricyclic Antidepressants Screen                 Legend    ^: Historical                            Assessment/Plan:      PPD#1 s/p vaginal delivery.  Asymptomatic anemia.  Vital signs stable.    Plan:  Continue routine postpartum care.  Advance.    Jade Chisholm, APRN  4/2/2024

## 2024-04-03 VITALS
HEART RATE: 71 BPM | HEIGHT: 65 IN | DIASTOLIC BLOOD PRESSURE: 50 MMHG | OXYGEN SATURATION: 98 % | WEIGHT: 183 LBS | RESPIRATION RATE: 16 BRPM | BODY MASS INDEX: 30.49 KG/M2 | SYSTOLIC BLOOD PRESSURE: 99 MMHG | TEMPERATURE: 97.8 F

## 2024-04-03 PROCEDURE — 0503F POSTPARTUM CARE VISIT: CPT | Performed by: NURSE PRACTITIONER

## 2024-04-03 PROCEDURE — S0260 H&P FOR SURGERY: HCPCS | Performed by: OBSTETRICS & GYNECOLOGY

## 2024-04-03 RX ADMIN — DOCUSATE SODIUM 100 MG: 100 CAPSULE, LIQUID FILLED ORAL at 08:15

## 2024-04-03 RX ADMIN — IBUPROFEN 600 MG: 600 TABLET, FILM COATED ORAL at 08:17

## 2024-04-03 NOTE — PLAN OF CARE
Problem: Adult Inpatient Plan of Care  Goal: Plan of Care Review  Outcome: Met  Goal: Patient-Specific Goal (Individualized)  Outcome: Met  Goal: Absence of Hospital-Acquired Illness or Injury  Outcome: Met  Intervention: Identify and Manage Fall Risk  Recent Flowsheet Documentation  Taken 4/3/2024 0818 by Ida Adair RN  Safety Promotion/Fall Prevention: safety round/check completed  Taken 4/3/2024 0720 by Ida Adair RN  Safety Promotion/Fall Prevention: safety round/check completed  Intervention: Prevent Skin Injury  Recent Flowsheet Documentation  Taken 4/3/2024 0818 by Ida Adair RN  Body Position: sitting up in bed  Intervention: Prevent and Manage VTE (Venous Thromboembolism) Risk  Recent Flowsheet Documentation  Taken 4/3/2024 0818 by Ida Adair RN  Activity Management: up ad sudheer  Goal: Optimal Comfort and Wellbeing  Outcome: Met  Intervention: Monitor Pain and Promote Comfort  Recent Flowsheet Documentation  Taken 4/3/2024 0818 by Ida Adair RN  Pain Management Interventions: see MAR  Intervention: Provide Person-Centered Care  Recent Flowsheet Documentation  Taken 4/3/2024 0818 by Ida Adair RN  Trust Relationship/Rapport: care explained  Goal: Readiness for Transition of Care  Outcome: Met   Goal Outcome Evaluation:

## 2024-04-03 NOTE — DISCHARGE SUMMARY
Discharge Summary    Date of Admission: 3/31/2024  Date of Discharge:  4/3/2024      Patient: Rosemary Hu      MR#:[id number]    Delivery Provider: Radha Murrell     Attending Provider: Betzy Ramirez MD    Presenting Problem/History of Present Illness  Normal labor [O80, Z37.9]       Normal labor         Discharge Diagnosis: Vaginal delivery at 39w2d    Procedures:  Vaginal, Forceps     2024    2:56 AM        Discharge Date: 4/3/2024;     Hospital Course  Patient is a 33 y.o. female  at 39w2d status post vaginal delivery without complication. Postpartum the patient did well. She remained afebrile, with vital signs stable. She was ready for discharge on postpartum day 2.     Infant:   male  fetus 3595 g (7 lb 14.8 oz)  with Apgar scores of 7 , 8  at five minutes.    Condition on Discharge:  Stable    Vital Signs  Temp:  [97.8 °F (36.6 °C)-98.1 °F (36.7 °C)] 97.8 °F (36.6 °C)  Heart Rate:  [65-73] 71  Resp:  [16-18] 16  BP: ()/(50-58) 99/50    Lab Results   Component Value Date    WBC 10.24 2024    HGB 8.9 (L) 2024    HCT 26.1 (L) 2024    MCV 91.9 2024     (L) 2024       Discharge Disposition  Home or Self Care    Discharge Medications     Discharge Medications        Continue These Medications        Instructions Start Date   Prenatal 19 29-1 MG chewable tablet              Stop These Medications      ascorbic acid 500 MG tablet  Commonly known as: VITAMIN C     aspirin 81 MG chewable tablet     cholecalciferol 10 MCG (400 UNIT) tablet     doxylamine 25 MG tablet  Commonly known as: UNISOM     lansoprazole 15 MG capsule  Commonly known as: PREVACID     Slow Fe 142 (45 Fe) MG tablet controlled-release  Generic drug: Ferrous Sulfate ER     vitamin B-6 50 MG tablet  Commonly known as: PYRIDOXINE     Zinc 50 MG tablet              Discharge Diet:     Activity at Discharge:   Activity Instructions       Sexual Activity Restrictions      Type of  Restriction: Sex    Explain Sexual Activity Restrictions: Pelvic rest for 6 weeks    Work Restrictions      Type of Restriction: Work    May Return to Work: After Next Appointment    With / Without Restrictions: Without Restrictions            Follow-up Appointments  No future appointments.  Additional Instructions for the Follow-ups that You Need to Schedule       Call MD With Problems / Concerns   As directed      Instructions: Call for temp >/= 100.4, severe pain, severe bleeding, headache that does not improve with rest, medication, blurred vision, or postpartum depression. Monitor incision for signs of infection including, foul odor, discharge or separation of the wound.  Call for blood clots larger than a golf ball or saturating a pad in less than an hour.    Order Comments: Instructions: Call for temp >/= 100.4, severe pain, severe bleeding, headache that does not improve with rest, medication, blurred vision, or postpartum depression. Monitor incision for signs of infection including, foul odor, discharge or separation of the wound.  Call for blood clots larger than a golf ball or saturating a pad in less than an hour.         Discharge Follow-up with Specified Provider: Dr. Ramirez; 6 Weeks   As directed      To: Dr. Ramirez   Follow Up: 6 Weeks                Blue Yi, APRN  04/03/24  09:09 EDT  Csd

## 2024-04-08 ENCOUNTER — TELEPHONE (OUTPATIENT)
Dept: OBSTETRICS AND GYNECOLOGY | Facility: CLINIC | Age: 34
End: 2024-04-08

## 2024-04-08 NOTE — TELEPHONE ENCOUNTER
Caller: Rosemary Hu    Relationship to patient: Self    Best call back number: [phone] (home)  CAN CALL BACK AND LVM    WOULD LIKE A CALL BACK TO CONFIRM FMLA HAS BEEN COMPLETED AND FAXED.

## 2024-04-10 ENCOUNTER — HOSPITAL ENCOUNTER (OUTPATIENT)
Dept: LACTATION | Facility: HOSPITAL | Age: 34
Discharge: HOME | End: 2024-04-10

## 2024-04-10 NOTE — H&P
[facility]  Obstetric History and Physical    Chief Complaint   Patient presents with    Rupture of Membranes       Subjective     Patient is a 33 y.o. female  currently at 39w2d, who presents with labor.    Her prenatal care is benign.  Her previous obstetric/gynecological history is noted for is non-contributory.    The following portions of the patients history were reviewed and updated as appropriate: current medications, allergies, past medical history, past surgical history, past family history, past social history, and problem list .       Prenatal Information:  Prenatal Results       Initial Prenatal Labs       Test Value Reference Range Date Time    Hemoglobin  13.0 g/dL 11.1 - 15.9 23 1215       12.8 g/dL 12.0 - 15.9 23 1519    Hematocrit  38.4 % 34.0 - 46.6 23 1215       37.2 % 34.0 - 46.6 23 1519    Platelets  293 x10E3/uL 150 - 450 23 1215       288 10*3/mm3 140 - 450 23 1519    Rubella IgG  1.39 index Immune >0.99 23 1215    Hepatitis B SAg  Negative  Negative 23 1215    Hepatitis C Ab  Non Reactive  Non Reactive 23 1215    RPR  Non Reactive  Non Reactive 24 1645       Non Reactive  Non Reactive 23 1215    T. Pallidum Ab   Non-Reactive  Non-Reactive 24 0030    ABO  A   24 0702    Rh  Positive   24 0702    Antibody Screen  Negative  Negative 23 1215    HIV  Non Reactive  Non Reactive 23 1215    Urine Culture  Final report   23 1215    Gonorrhea  Negative  Negative 23     Chlamydia  Negative  Negative 23     TSH  0.920 uIU/mL 0.450 - 4.500 24 1645    HgB A1c         Varicella IgG        HgB Electrophoresis         Cystic fibrosis                   Fetal testing        Test Value Reference Range Date Time    NIPT        MSAFP        AFP-4                  2nd and 3rd Trimester       Test Value Reference Range Date Time    Hemoglobin (repeated)  8.9 g/dL 12.0 - 15.9 24 0633        12.2 g/dL 12.0 - 15.9 04/01/24 0030       10.9 g/dL 11.1 - 15.9 01/18/24 1645    Hematocrit (repeated)  26.1 % 34.0 - 46.6 04/02/24 0633       35.0 % 34.0 - 46.6 04/01/24 0030       31.2 % 34.0 - 46.6 01/18/24 1645    Platelets   135 10*3/mm3 140 - 450 04/02/24 0633       199 10*3/mm3 140 - 450 04/01/24 0030       195 x10E3/uL 150 - 450 01/18/24 1645       293 x10E3/uL 150 - 450 09/01/23 1215       288 10*3/mm3 140 - 450 08/05/23 1519    GCT  82 mg/dL 70 - 139 01/18/24 1645    Antibody Screen (repeated)  Negative   04/01/24 0030       Negative  Negative 01/18/24 1645    3rd TM syphilis scrn (repeated)  RPR   Non Reactive  Non Reactive 01/18/24 1645    3rd TM syphilis scrn (repeated) FTA        GTT Fasting        GTT 1 Hr        GTT 2 Hr        GTT 3 Hr        Group B Strep  Streptococcus agalactiae (Group B)   03/12/24 1849              Other testing        Test Value Reference Range Date Time    Parvo IgG         CMV IgG                   Drug Screening       Test Value Reference Range Date Time    Amphetamine Screen  Negative ng/mL Qkonce=0396 09/01/23 1215    Barbiturate Screen  Negative ng/mL Zirdol=806 09/01/23 1215    Benzodiazepine Screen  Negative ng/mL Zpmqwv=759 09/01/23 1215    Methadone Screen  Negative ng/mL Yzrtxs=629 09/01/23 1215    Phencyclidine Screen  Negative ng/mL Cutoff=25 09/01/23 1215    Opiates Screen  Negative ng/mL Dronzk=118 09/01/23 1215    THC Screen  Negative ng/mL Cutoff=20 09/01/23 1215    Cocaine Screen  Negative ng/mL Zqjttm=228 09/01/23 1215    Propoxyphene Screen  Negative ng/mL Ptambn=944 09/01/23 1215    Buprenorphine Screen        Methamphetamine Screen        Oxycodone Screen        Tricyclic Antidepressants Screen                  Legend    ^: Historical                          External Prenatal Results       Pregnancy Outside Results - Transcribed From Office Records - See Scanned Records For Details       Test Value Date Time    ABO  A  04/01/24 0702    Rh  Positive   24 0702    Antibody Screen  Negative  24 0030       Negative  24 1645       Negative  23 1215    Varicella IgG       Rubella  1.39 index 23 1215    Hgb  8.9 g/dL 24 0633       12.2 g/dL 24 0030       10.9 g/dL 24 1645       13.0 g/dL 23 1215       12.8 g/dL 23 1519    Hct  26.1 % 24 0633       35.0 % 24 0030       31.2 % 24 1645       38.4 % 23 1215       37.2 % 23 1519    Glucose Fasting GTT       Glucose Tolerance Test 1 hour       Glucose Tolerance Test 3 hour       Gonorrhea (discrete)  Negative  23     Chlamydia (discrete)  Negative  23     RPR  Non Reactive  24 1645       Non Reactive  23 1215    VDRL       Syphilis Antibody       HBsAg  Negative  23 1215    Herpes Simplex Virus PCR       Herpes Simplex VIrus Culture       HIV  Non Reactive  23 1215    Hep C RNA Quant PCR       Hep C Antibody  Non Reactive  23 1215    AFP       Group B Strep  Streptococcus agalactiae (Group B)  24 1849    GBS Susceptibility to Clindamycin       GBS Susceptibility to Erythromycin       Fetal Fibronectin       Genetic Testing, Maternal Blood                 Drug Screening       Test Value Date Time    Urine Drug Screen       Amphetamine Screen  Negative ng/mL 23 1215    Barbiturate Screen  Negative ng/mL 23 1215    Benzodiazepine Screen  Negative ng/mL 23 1215    Methadone Screen  Negative ng/mL 23 1215    Phencyclidine Screen  Negative ng/mL 23 1215    Opiates Screen       THC Screen       Cocaine Screen       Propoxyphene Screen  Negative ng/mL 23 1215    Buprenorphine Screen       Methamphetamine Screen       Oxycodone Screen       Tricyclic Antidepressants Screen                 Legend    ^: Historical                             Past OB History:     OB History    Para Term  AB Living   2 1 1 0 1 1   SAB IAB Ectopic Molar Multiple Live  Births   1 0 0 0 0 1      # Outcome Date GA Lbr Bebo/2nd Weight Sex Type Anes PTL Lv   2 Term 24 39w2d 03:59 / 00:27 3595 g (7 lb 14.8 oz) M Vag-Forceps EPI N DEUCE      Complications: Fetal Intolerance      Name: [place] Hu      Apgar1: 7  Apgar5: 8   1 SAB      Biochemical         Obstetric Comments   Fob #1 - Pregnancy #1 and #2       Past Medical History: Past Medical History:   Diagnosis Date    Anxiety     Depression      product of IVF pregnancy     fresh cycle- no donors      Past Surgical History Past Surgical History:   Procedure Laterality Date    KNEE ACL RECONSTRUCTION      OTHER SURGICAL HISTORY      egg retrieval X2    WISDOM TOOTH EXTRACTION        Family History: Family History   Problem Relation Age of Onset    Hypertension Father     Uterine cancer Neg Hx     Colon cancer Neg Hx     Breast cancer Neg Hx     Ovarian cancer Neg Hx       Social History:  reports that she has never smoked. She has never been exposed to tobacco smoke. She has never used smokeless tobacco.   reports that she does not currently use alcohol.   reports no history of drug use.        Review of Systems:    All other systems reviewed and negative    Objective     Vital Signs Range for the last 24 hours  Temperature:     Temp Source:     BP:     Pulse:     Respirations:     SPO2:     O2 Amount (l/min):     O2 Devices     Weight:       Physical Examination: General appearance - alert, well appearing, and in no distress  Neck - supple, no significant adenopathy  Abdomen - soft, nontender, nondistended, no masses or organomegaly  Musculoskeletal - no joint tenderness, deformity or swelling  Extremities - peripheral pulses normal, no pedal edema, no clubbing or cyanosis  Skin - normal coloration and turgor, no rashes, no suspicious skin lesions noted    Presentation: vtx   Cervix: Exam by:     Dilation:    Complete on my arrival   Effacement:     Station:       Fetal Heart Rate Assessment   Method:      Beats/min:     Baseline:     Varibility:     Accels:     Decels:     Tracing Category:           Uterine Assessment   Method:     Frequency (min):     Ctx Count in 10 min:     Duration:     Intensity:     Intensity by IUPC:     Resting Tone:     Resting Tone by IUPC:     [place] Units:         Assessment & Plan       Normal labor      Assessment & Plan    Assessment:  1.  Intrauterine pregnancy at 39w2d weeks gestation   2.  Normal labor    Plan:  1. Now delivered  2. Routine PP Care      Radha Murrell MD  4/9/2024  21:39 EDT

## 2024-04-10 NOTE — LACTATION NOTE
04/10/24 1500   Maternal Information   Date of Referral 04/10/24   Person Making Referral patient   Maternal Reason for Referral breast/nipple pain  (left breastfeeding painful, right breastfeeding is preferred by infant)   Infant Reason for Referral hyperbilirubinemia;one breast preferred;sleepy  (would like a weighted feeding, ped (Dr Grant) is monitoring jaundice levels and has recommended 20-30ml of supplementation after breastfeeding for 15min on each breast)   Maternal Assessment   Breast Size Issue none   Breast Shape Bilateral:;round   Breast Density Bilateral:;full   Nipples Bilateral:;everted   Left Nipple Symptoms intact;tender   Right Nipple Symptoms intact;tender   Maternal Infant Feeding   Maternal Emotional State receptive   Infant Positioning cross-cradle;clutch/football  (left sitting up/laid back switched to left cross cradle due to painful latch; right cross cradle;)   Signs of Milk Transfer deep jaw excursions noted;audible swallow;transfer present   Pain with Feeding no  (in deep cross cradle with nipple to nose positioning, per pt report)   Comfort Measures Before/During Feeding infant position adjusted;latch adjusted;maternal position adjusted;suction broken using finger;other (see comments)  (unrolling upper lip resulted in deeper more comfortable latch)   Milk Ejection Reflex present  (haakaa on right breast collected 1oz of breastmilk while infant was breastfeeding on left breast)   Comfort Measures Following Feeding breast pads utilized   Prefeeding Nipple Condition pink   Postfeeding Nipple Condition pink;reddened   Nipple Shape After Feeding, Right round;symmetrical;appropriately projected   Latch Assistance full assistance needed   Support Person Involvement actively supporting mother   Additional Documentation Breastfeeding Supplementation (Group)   Breastfeeding Supplementation   Person Feeding Infant lactation consultant;father   Infant Indication for Supplementation jaundice    Breastfeeding Supplementation Type expressed breast milk   Method of Supplementation paced bottle  (paced bottle feeding demonstrated; father provided majority of bottle feeding in deep latch with lips unrolled)   Nipple Used For Supplementation preemie  (Preemie and Transition Dr Haile bottle provided (switched from Size 2 Dr Brown bottle nipple that was reportedly causing drooling/leaking with bottle feeding))   Feeding Infant   Feeding Readiness Cues hand to mouth movements;rooting   Satiety Cues decreased number of sucks   Feeding Tolerance/Success arousal required;coordinated suck/swallow/breathing;intermittent pauses;rooting;sustained suck   Feeding Physical Stress Cues fatigues quickly;respirations unchanged   Effective Latch During Feeding yes   Suck/Swallow/Breathing Coordination present   Skin-to-Skin Length of Time 22   Skin-to-Skin Contact, Duration minutes   Prefeeding Weight (gm) 3665 g (129.3 oz)   Postfeeding Weight (gm) 3720 g (131.2 oz)   Weight Gain/Loss (gm)  55 g (1.9 oz)   Breastfeeding Session   Breastfeeding breastfeeding, bilateral   Breastfeeding Time, Left (min) 1st breast, 6 min, 35ml transferred (higher producing breast)   Breastfeeding Time, Right (min) 2nd breast, 16min, 20ml transferred   Infant-Driven Feeding Readiness©   Infant-Driven Feeding Scales - Readiness 2   Infant-Driven Feeding Scales - Quality 2   Infant-Driven Feeding Scales - Caregiver Techniques A;B;C   LATCH Score   Latch 2-->grasps breast, tongue down, lips flanged, rhythmic sucking   Audible Swallowing 2-->spontaneous and intermittent (24 hrs old)   Type of Nipple 2-->everted (after stimulation)   Comfort (Breast/Nipple) 1-->filling, red/small blisters/bruises, mild/mod discomfort   Hold (Positioning) 1-->minimal assist, teach one side, mother does other, staff holds   Latch Score 8   Milk Expression/Equipment   Breast Pump Type double electric, personal  (Medela)   Breast Pump Flange Type hard   Breast Pump Flange  Size 27 mm;30 mm  (27mm recommended; 30mm used in clinic, reports tenderness after breastfeeding/pumping conclusion)   Breast Pumping   Breast Pumping Interventions post-feed pumping encouraged  (for short/missed feedings, if supplementation is required, or if breastfeeding becomes too painful, to encourage breastmilk production)     Reason for Visit:   Rosemary and Nic come to the outpatient lactation clinic with infant, Chris uH  2024 due to painful/difficult latch to left breast, developing breast preference to right breast,and supplementation requirement for jaundice (per Dr Grant). Chris experienced a forceps assisted delivery and is also followed by ENT.  Rosemary reports she is breastfeeding on each breast for 15minutes and then FOB is supplementing with 20-30ml of expressed breastmilk. She is able to pump roughly 1.5oz of expressed breastmilk after breastfeeding. (During one pumping session without breastfeeding, she pumped 5oz between both breasts in 15-20min time). She reports Chris would stay latched and not come unlatched, but be increasingly sleepy, if allowed. At this time, feeding activities take roughly one hour and parents have been counting end of feeding to start of next feeding to be feeding every 2hrs in the day time and every 3hrs at night time (achieving 8-9 feedings in a 24hr period). Family reports Chris is becoming more alert with each day and less sleepy.     Assessment:   Today, at 9 days of age, Chris weighs 3665g (8lb 1.2oz), compared to birth weight of 3595g (7lb 14.8oz). Based on his current weight, anticipated transfer of breastmilk per feeding (every 3hrs) is 2.5oz or 75.7ml. During 22 minutes of breastfeeding, Chris transferred 55ml of breastmilk. Rosemary began pumping and was able to express an additional 3 ounces of breastmilk during Chris's supplementation using her Medela pump. Chris was positioned for a deeper latch with unrolled lips on the  Dr Be preemie bottle, and was able to transfer the supplemental feeding without leaking or drooling. To achieve good latching and pulling, Chris required a very deep latch, this was also encouraged with breastfeeding (and resulted in no visible nipple compression on second/right nipple).    Recommendations:   For appropriate latch: unroll upper lips, latch breast/bottle in deep nipple to nose positioning, angled toward the roof of the mouth.  For bottle feeding: unroll lips and utilize sidelying paced bottle feeding with preemie Dr Be bottle (which should take 12-15minutes if not breastfeeding).   For breastfeeding relationship: breastfeed in the day time (while considering pumping and bottle feeding at night to promote family rest and counting start time to next start time at the 3hr intervals). Monitor feeding times and signs of breast emptying as well as hunger cues between breastfeeding sessions, to continue supplementation after 15mins of breastfeeding per breast.   For breastmilk production: pump borth breasts simultaneously for 15-20minutes with each bottle feeding session and monitor for signs of plugged ducts/mastitis.  Keep pediatrician follow up appointment next week and follow up with Outpatient Lactation Clinic as desired.  All questions answered at this time. PRN Lactation Consultant/Clinic contact encouraged.

## 2024-04-12 ENCOUNTER — MATERNAL SCREENING (OUTPATIENT)
Dept: CALL CENTER | Facility: HOSPITAL | Age: 34
End: 2024-04-12
Payer: COMMERCIAL

## 2024-04-12 NOTE — OUTREACH NOTE
Maternal Screening Survey      Flowsheet Row Responses   Facility patient discharged from? [facility]   Attempt successful? Yes   Call start time    Call end time 161   Person spoke with today (if not patient) and relationship patient   EPD Scale: Able to Laugh 0-->as much as she always could   EPD Scale: Looked Forward 0-->as much as she ever did   EPD Scale: Blamed Self 2-->yes, some of the time   EPD Scale: Been Anxious 2-->yes, sometimes  [Britni reports this is something she has always experienced.]   EPD Scale: Felt Panicky 2-->yes, sometimes   EPD Scale: Things Getting on Top 0-->no, has been coping as well as ever   EPD Scale: Difficulty Sleeping 0-->no, not at all   EPD Scale: Sad or Miserable 0-->no, not at all   EPD Scale: Crying 0-->no, never   EPD Scale: Thought of Harming Self 0-->never   [place]  Depression Score 6   Did any of your parents have problems with alcohol or drug use? No   Do any of your peers have problems with alcohol or drug use? No   Does your partner have problems with alcohol or drug use? No   Before you were pregnant did you have problems with alcohol or drug use? (past) No   In the past month, did you drink beer, wine, liquor or use any other drugs? (pregnancy) No   Maternal Screening call completed Yes   Call end time               Paco MORRIS - Registered Nurse

## 2024-04-12 NOTE — OUTREACH NOTE
Maternal Screening Survey      Flowsheet Row Responses   Facility patient discharged from? [facility]   Attempt successful? No   Unsuccessful attempts Attempt 1              Paco MORRIS - Registered Nurse

## 2024-04-19 ENCOUNTER — HOSPITAL ENCOUNTER (OUTPATIENT)
Dept: LACTATION | Facility: HOSPITAL | Age: 34
Discharge: HOME | End: 2024-04-19

## 2024-04-19 NOTE — LACTATION NOTE
Infant: Chris Hu  : 24  Pediatrician:  Dr. Grant  Birth weight: 7 lb 14.8 oz  Current weight: 8 lb 14.6 oz    Reason for visit:  Wanting to follow-up from first lactation appointment and see how things are going now.  Questions pertaining to milk storage, breast feeding, latching, pumping, milk supply, bottle feeding, pacifiers answered.  Parents are concerned latch may be ruined due to volume of bottles that have been given.  Unsure infant is transferring well at the breast.     Exam/Assessment:  Upon oral exam, Chris has a strong suck.  He is coordinated and does not break latch.  Weight gain is great from previous visit.      Rosemary is pumping after nursing to gain milk supply and they are supplementing after some feedings.  State that Chris appears hungry 30 minutes after eating and is sleepy at the breast.      Adjusted Rosemary's hold and Chris's position to nipple to nose.  Rosemary able to latch in CC hold independently.  Chris had a wide gape and deep latch.  Required no manual flaring.  Demonstrated to Rosemary how she can manually massage and express milk to encourage Chris during feeding.  Chris nursed consistently for 8 minutes before switching to non-nutritive sucking.  He was removed from the left breast and weighed.  Chris transferred 90 mls from the left breast.  He was repositioned to the right breast and Rosemayr latched independently.  His latch was deep and pain free.  Chris took encouragement through stimulation to feed but eventually kicked into gear.  He latched and nursed for 10 minutes lazily.  Transferring 15 mls on the second side.  Total, Chris transferred 3.5 ounces at the breast.  Calculations taking his weight into account show that he needs at least 2.6 ounces every 3 hours.  Chris is nursing great.    Lactation questions answered.  Encouraged mom to continue nursing on demand and not schedule infant right now.      Plan of Care:     Feed on  demand.  Ok to give pacifier.  Still give at least one bottle a day and pump when bottle is being given.  Pump after 1-2 feeds a day.       04/19/24 1300   Maternal Information   Date of Referral 04/19/24   Person Making Referral patient   Maternal Reason for Referral breastfeeding currently   Maternal Assessment   Breast Size Issue none   Breast Shape Bilateral:;round   Breast Density Bilateral:;full   Nipples Bilateral:;short;everted   Left Nipple Symptoms intact;nontender   Right Nipple Symptoms intact;nontender   Maternal Infant Feeding   Maternal Emotional State receptive;anxious   Infant Positioning cross-cradle   Signs of Milk Transfer audible swallow;deep jaw excursions noted;transfer present   Pain with Feeding no   Comfort Measures Before/During Feeding infant position adjusted   Latch Assistance minimal assistance;verbal guidance offered   Support Person Involvement actively supporting mother;verbally supports mother   Feeding Infant   Satiety Cues decreased number of sucks   Feeding Tolerance/Success coordinated suck/swallow/breathing;arousal required;eager   Effective Latch During Feeding yes   Suck/Swallow/Breathing Coordination present   Prefeeding Weight (gm) 4045 g (142.7 oz)   Postfeeding Weight (gm) 4150 g (146.4 oz)   Weight Gain/Loss (gm)  105 g (3.7 oz)   Breastfeeding Session   Breastfeeding breastfeeding, bilateral   Breastfeeding Time, Left (min) 8   Breastfeeding Time, Right (min) 10   Infant-Driven Feeding Readiness©   Infant-Driven Feeding Scales - Readiness 1   Infant-Driven Feeding Scales - Quality 1   Infant-Driven Feeding Scales - Caregiver Techniques A   LATCH Score   Latch 2-->grasps breast, tongue down, lips flanged, rhythmic sucking   Audible Swallowing 2-->spontaneous and intermittent (24 hrs old)   Type of Nipple 2-->everted (after stimulation)   Comfort (Breast/Nipple) 2-->soft/nontender   Hold (Positioning) 1-->minimal assist, teach one side, mother does other, staff holds    Latch Score 9

## 2024-05-14 ENCOUNTER — POSTPARTUM VISIT (OUTPATIENT)
Dept: OBSTETRICS AND GYNECOLOGY | Facility: CLINIC | Age: 34
End: 2024-05-14
Payer: COMMERCIAL

## 2024-05-14 VITALS
BODY MASS INDEX: 26.26 KG/M2 | SYSTOLIC BLOOD PRESSURE: 122 MMHG | HEIGHT: 65 IN | DIASTOLIC BLOOD PRESSURE: 78 MMHG | WEIGHT: 157.6 LBS

## 2024-05-14 DIAGNOSIS — N39.3 SUI (STRESS URINARY INCONTINENCE, FEMALE): ICD-10-CM

## 2024-05-14 RX ORDER — RIBOFLAVIN (VITAMIN B2) 100 MG
TABLET ORAL
COMMUNITY

## 2024-05-14 RX ORDER — CETIRIZINE HYDROCHLORIDE 10 MG/1
TABLET ORAL
COMMUNITY

## 2024-05-14 RX ORDER — MELATONIN: COMMUNITY

## 2024-05-14 NOTE — PROGRESS NOTES
Chief Complaint   Patient presents with    Postpartum Care       Postpartum Visit         Rosemarychristine Hu is a 33 y.o.  who presents today for a 6 week(s) postpartum check.     C/S: no     Vaginal, Forceps    Information for the patient's :  Chris Hu [[id number]]   [birth year]24   male   Chris Hu   3595 g (7 lb 14.8 oz)   Gestational Age: 39w2d        Baby Discharged: Discharged with Mom  Delivering Physician: Radha Murrell MD    Her pregnancy was complicated by no known issues. The episiotomy is healing well. Patient describes vaginal bleeding as absent.  Patient is breastfeeding.  She desires  none  for contraception, h/o infertility and conceived via IVF.      She would like to discuss the following complaints today: BRENDON. Patient requesting referral for pelvic floor PT.      Patient denies concerns for postpartum depression/anxiety. Patient denies  suicidal or homicidal ideation. Her postpartum depression screening questionnaire: 3. No treatment is indicated      Last Pap : 2022. Results: negative. HPV: unknown - performed at outside facility.   Last Completed Pap Smear       This patient has no relevant Health Maintenance data.              The additional following portions of the patient's history were reviewed and updated as appropriate: allergies and current medications.    Review of Systems   Constitutional: Negative.    HENT: Negative.     Eyes: Negative.    Respiratory: Negative.     Cardiovascular: Negative.    Gastrointestinal: Negative.    Endocrine: Negative.    Genitourinary:  Positive for urinary incontinence.   Musculoskeletal: Negative.    Skin: Negative.    Allergic/Immunologic: Negative.    Neurological: Negative.    Hematological: Negative.    Psychiatric/Behavioral: Negative.       All other systems reviewed and are negative.     I have reviewed and agree with the HPI, ROS, and historical information as entered above. Betzy Ramirez,  "MD      /78   Ht 165.1 cm (65\")   Wt 71.5 kg (157 lb 9.6 oz)   LMP 07/01/2023   Breastfeeding Yes   BMI 26.23 kg/m²     Physical Exam  Vitals and nursing note reviewed. Exam conducted with a chaperone present.   Constitutional:       Appearance: She is well-developed.   HENT:      Head: Normocephalic and atraumatic.   Neck:      Thyroid: No thyroid mass or thyromegaly.   Pulmonary:      Breath sounds: No rhonchi.   Abdominal:      Palpations: Abdomen is soft. Abdomen is not rigid. There is no mass.      Tenderness: There is no abdominal tenderness. There is no guarding.      Hernia: No hernia is present.   Genitourinary:     Vagina: Normal.      Cervix: Normal.      Uterus: Normal.    Musculoskeletal:      Cervical back: Normal range of motion. No muscular tenderness.   Neurological:      Mental Status: She is alert and oriented to person, place, and time.   Psychiatric:         Behavior: Behavior normal.             Assessment and Plan    Problem List Items Addressed This Visit    None  Visit Diagnoses       Postpartum exam    -  Primary    Relevant Orders    LIQUID-BASED PAP SMEAR WITH HPV GENOTYPING REGARDLESS OF INTERPRETATION ([facility])    Ambulatory Referral to Physical Therapy    BRENDON (stress urinary incontinence, female)        Relevant Orders    Ambulatory Referral to Physical Therapy            S/p Vaginal delivery, 6 week(s) postpartum.  Doing well.    Return to normal physical activity.  No pelvic restrictions.   Baby doing well.  Breastfeeding going well.  No si/sx of postpartum depression  Contraception: contraceptive methods: None  BRENDON - refer to PT  No follow-ups on file.     Betzy Ramirez MD  05/14/2024   "

## 2024-05-16 LAB — REF LAB TEST METHOD: NORMAL
